# Patient Record
Sex: MALE | Race: WHITE | NOT HISPANIC OR LATINO | URBAN - METROPOLITAN AREA
[De-identification: names, ages, dates, MRNs, and addresses within clinical notes are randomized per-mention and may not be internally consistent; named-entity substitution may affect disease eponyms.]

---

## 2019-07-08 ENCOUNTER — HISTORICAL (OUTPATIENT)
Dept: CARDIOLOGY | Facility: HOSPITAL | Age: 49
End: 2019-07-08

## 2022-04-11 ENCOUNTER — HISTORICAL (OUTPATIENT)
Dept: ADMINISTRATIVE | Facility: HOSPITAL | Age: 52
End: 2022-04-11

## 2022-04-27 VITALS
HEIGHT: 70 IN | DIASTOLIC BLOOD PRESSURE: 100 MMHG | SYSTOLIC BLOOD PRESSURE: 176 MMHG | BODY MASS INDEX: 32.51 KG/M2 | WEIGHT: 227.06 LBS

## 2024-04-10 ENCOUNTER — HOSPITAL ENCOUNTER (INPATIENT)
Facility: HOSPITAL | Age: 54
LOS: 3 days | Discharge: HOME | DRG: 872 | End: 2024-04-13
Attending: EMERGENCY MEDICINE | Admitting: INTERNAL MEDICINE
Payer: MEDICAID

## 2024-04-10 DIAGNOSIS — K82.8 GALLBLADDER SLUDGE: ICD-10-CM

## 2024-04-10 DIAGNOSIS — M54.9 ACUTE BILATERAL BACK PAIN, UNSPECIFIED BACK LOCATION: ICD-10-CM

## 2024-04-10 DIAGNOSIS — K81.0 ACUTE CHOLECYSTITIS: Primary | ICD-10-CM

## 2024-04-10 DIAGNOSIS — A41.9 ACUTE ONSET SEPSIS: ICD-10-CM

## 2024-04-10 DIAGNOSIS — R55 SYNCOPE: ICD-10-CM

## 2024-04-10 DIAGNOSIS — R11.2 NAUSEA AND VOMITING, UNSPECIFIED VOMITING TYPE: ICD-10-CM

## 2024-04-10 DIAGNOSIS — R07.9 CHEST PAIN: ICD-10-CM

## 2024-04-10 LAB
ALBUMIN SERPL-MCNC: 3.6 G/DL (ref 3.5–5)
ALBUMIN/GLOB SERPL: 1.1 RATIO (ref 1.1–2)
ALP SERPL-CCNC: 116 UNIT/L (ref 40–150)
ALT SERPL-CCNC: 109 UNIT/L (ref 0–55)
APPEARANCE UR: CLEAR
AST SERPL-CCNC: 105 UNIT/L (ref 5–34)
BACTERIA #/AREA URNS AUTO: ABNORMAL /HPF
BASOPHILS # BLD AUTO: 0.06 X10(3)/MCL
BASOPHILS NFR BLD AUTO: 0.3 %
BILIRUB SERPL-MCNC: 1.9 MG/DL
BILIRUB UR QL STRIP.AUTO: ABNORMAL
BUN SERPL-MCNC: 11.7 MG/DL (ref 8.4–25.7)
CALCIUM SERPL-MCNC: 9 MG/DL (ref 8.4–10.2)
CHLORIDE SERPL-SCNC: 105 MMOL/L (ref 98–107)
CO2 SERPL-SCNC: 22 MMOL/L (ref 22–29)
COLOR UR AUTO: ABNORMAL
CREAT SERPL-MCNC: 0.82 MG/DL (ref 0.73–1.18)
EOSINOPHIL # BLD AUTO: 0.1 X10(3)/MCL (ref 0–0.9)
EOSINOPHIL NFR BLD AUTO: 0.5 %
ERYTHROCYTE [DISTWIDTH] IN BLOOD BY AUTOMATED COUNT: 13.4 % (ref 11.5–17)
GFR SERPLBLD CREATININE-BSD FMLA CKD-EPI: >60 MLS/MIN/1.73/M2
GLOBULIN SER-MCNC: 3.2 GM/DL (ref 2.4–3.5)
GLUCOSE SERPL-MCNC: 176 MG/DL (ref 74–100)
GLUCOSE UR QL STRIP.AUTO: ABNORMAL
HCT VFR BLD AUTO: 47.7 % (ref 42–52)
HGB BLD-MCNC: 16.2 G/DL (ref 14–18)
IMM GRANULOCYTES # BLD AUTO: 0.14 X10(3)/MCL (ref 0–0.04)
IMM GRANULOCYTES NFR BLD AUTO: 0.7 %
KETONES UR QL STRIP.AUTO: ABNORMAL
LEUKOCYTE ESTERASE UR QL STRIP.AUTO: NEGATIVE
LYMPHOCYTES # BLD AUTO: 0.71 X10(3)/MCL (ref 0.6–4.6)
LYMPHOCYTES NFR BLD AUTO: 3.5 %
MCH RBC QN AUTO: 28.3 PG (ref 27–31)
MCHC RBC AUTO-ENTMCNC: 34 G/DL (ref 33–36)
MCV RBC AUTO: 83.4 FL (ref 80–94)
MONOCYTES # BLD AUTO: 1.12 X10(3)/MCL (ref 0.1–1.3)
MONOCYTES NFR BLD AUTO: 5.6 %
MUCOUS THREADS URNS QL MICRO: ABNORMAL /LPF
NEUTROPHILS # BLD AUTO: 18.03 X10(3)/MCL (ref 2.1–9.2)
NEUTROPHILS NFR BLD AUTO: 89.4 %
NITRITE UR QL STRIP.AUTO: NEGATIVE
NRBC BLD AUTO-RTO: 0 %
OHS QRS DURATION: 124 MS
OHS QTC CALCULATION: 435 MS
PH UR STRIP.AUTO: 5.5 [PH]
PLATELET # BLD AUTO: 183 X10(3)/MCL (ref 130–400)
PMV BLD AUTO: 10.1 FL (ref 7.4–10.4)
POTASSIUM SERPL-SCNC: 3.4 MMOL/L (ref 3.5–5.1)
PROT SERPL-MCNC: 6.8 GM/DL (ref 6.4–8.3)
PROT UR QL STRIP.AUTO: ABNORMAL
RBC # BLD AUTO: 5.72 X10(6)/MCL (ref 4.7–6.1)
RBC #/AREA URNS AUTO: ABNORMAL /HPF
RBC UR QL AUTO: ABNORMAL
SODIUM SERPL-SCNC: 135 MMOL/L (ref 136–145)
SP GR UR STRIP.AUTO: 1.04 (ref 1–1.03)
SQUAMOUS #/AREA URNS LPF: ABNORMAL /HPF
TROPONIN I SERPL-MCNC: 0.02 NG/ML (ref 0–0.04)
UROBILINOGEN UR STRIP-ACNC: 4
WBC # SPEC AUTO: 20.16 X10(3)/MCL (ref 4.5–11.5)
WBC #/AREA URNS AUTO: ABNORMAL /HPF

## 2024-04-10 PROCEDURE — 93010 ELECTROCARDIOGRAM REPORT: CPT | Mod: ,,, | Performed by: INTERNAL MEDICINE

## 2024-04-10 PROCEDURE — 11000001 HC ACUTE MED/SURG PRIVATE ROOM

## 2024-04-10 PROCEDURE — 80053 COMPREHEN METABOLIC PANEL: CPT

## 2024-04-10 PROCEDURE — 84484 ASSAY OF TROPONIN QUANT: CPT

## 2024-04-10 PROCEDURE — 81001 URINALYSIS AUTO W/SCOPE: CPT

## 2024-04-10 PROCEDURE — 85025 COMPLETE CBC W/AUTO DIFF WBC: CPT

## 2024-04-10 PROCEDURE — 93005 ELECTROCARDIOGRAM TRACING: CPT

## 2024-04-10 NOTE — FIRST PROVIDER EVALUATION
"Medical screening examination initiated.  I have conducted a focused provider triage encounter, findings are as follows:    Brief history of present illness:  arrived to ED due to multiple syncopal episodes, dizziness, and headache. Patient denies falling. Reports LOC occurred while sitting. On BT.     Vitals:    04/10/24 1815   BP: (!) 141/72   Pulse: 84   Resp: 20   Temp: 100.3 °F (37.9 °C)   TempSrc: Oral   SpO2: 95%   Weight: 108.9 kg (240 lb)   Height: 5' 10" (1.778 m)       Pertinent physical exam:  awake, alert, in wheelchair, has non-labored breathing    Brief workup plan:  labs, EKG, imaging     Preliminary workup initiated; this workup will be continued and followed by the physician or advanced practice provider that is assigned to the patient when roomed.  "

## 2024-04-11 LAB
ALBUMIN SERPL-MCNC: 3 G/DL (ref 3.5–5)
ALBUMIN/GLOB SERPL: 1.3 RATIO (ref 1.1–2)
ALP SERPL-CCNC: 89 UNIT/L (ref 40–150)
ALT SERPL-CCNC: 81 UNIT/L (ref 0–55)
AMPHET UR QL SCN: NEGATIVE
AMYLASE SERPL-CCNC: 12 UNIT/L (ref 25–125)
AST SERPL-CCNC: 52 UNIT/L (ref 5–34)
AV INDEX (PROSTH): 0.58
AV MEAN GRADIENT: 10 MMHG
AV PEAK GRADIENT: 17 MMHG
AV VALVE AREA BY VELOCITY RATIO: 3.18 CM²
AV VALVE AREA: 3.34 CM²
AV VELOCITY RATIO: 0.56
BARBITURATE SCN PRESENT UR: NEGATIVE
BASOPHILS # BLD AUTO: 0.04 X10(3)/MCL
BASOPHILS NFR BLD AUTO: 0.2 %
BENZODIAZ UR QL SCN: NEGATIVE
BILIRUB SERPL-MCNC: 2.3 MG/DL
BILIRUB SERPL-MCNC: 2.3 MG/DL
BILIRUBIN DIRECT+TOT PNL SERPL-MCNC: 0.8 MG/DL (ref 0–?)
BILIRUBIN DIRECT+TOT PNL SERPL-MCNC: 1.5 MG/DL (ref 0–0.8)
BNP BLD-MCNC: 180.6 PG/ML
BSA FOR ECHO PROCEDURE: 2.32 M2
BUN SERPL-MCNC: 9.1 MG/DL (ref 8.4–25.7)
CALCIUM SERPL-MCNC: 8.3 MG/DL (ref 8.4–10.2)
CANNABINOIDS UR QL SCN: NEGATIVE
CHLORIDE SERPL-SCNC: 101 MMOL/L (ref 98–107)
CO2 SERPL-SCNC: 27 MMOL/L (ref 22–29)
COCAINE UR QL SCN: NEGATIVE
CREAT SERPL-MCNC: 0.82 MG/DL (ref 0.73–1.18)
CV ECHO LV RWT: 0.4 CM
DOP CALC AO PEAK VEL: 2.07 M/S
DOP CALC AO VTI: 36.8 CM
DOP CALC LVOT AREA: 5.7 CM2
DOP CALC LVOT DIAMETER: 2.7 CM
DOP CALC LVOT PEAK VEL: 1.15 M/S
DOP CALC LVOT STROKE VOLUME: 123.04 CM3
DOP CALC MV VTI: 34.3 CM
DOP CALCLVOT PEAK VEL VTI: 21.5 CM
E WAVE DECELERATION TIME: 215 MSEC
E/A RATIO: 0.99
E/E' RATIO: 10.75 M/S
ECHO LV POSTERIOR WALL: 1.3 CM (ref 0.6–1.1)
EOSINOPHIL # BLD AUTO: 0.03 X10(3)/MCL (ref 0–0.9)
EOSINOPHIL NFR BLD AUTO: 0.2 %
ERYTHROCYTE [DISTWIDTH] IN BLOOD BY AUTOMATED COUNT: 13.7 % (ref 11.5–17)
EST. AVERAGE GLUCOSE BLD GHB EST-MCNC: 197.3 MG/DL
FENTANYL UR QL SCN: NEGATIVE
FRACTIONAL SHORTENING: 23 % (ref 28–44)
GFR SERPLBLD CREATININE-BSD FMLA CKD-EPI: >60 MLS/MIN/1.73/M2
GLOBULIN SER-MCNC: 2.4 GM/DL (ref 2.4–3.5)
GLUCOSE SERPL-MCNC: 122 MG/DL (ref 74–100)
HBA1C MFR BLD: 8.5 %
HCT VFR BLD AUTO: 40.6 % (ref 42–52)
HGB BLD-MCNC: 14.3 G/DL (ref 14–18)
IMM GRANULOCYTES # BLD AUTO: 0.13 X10(3)/MCL (ref 0–0.04)
IMM GRANULOCYTES NFR BLD AUTO: 0.7 %
INTERVENTRICULAR SEPTUM: 1.75 CM (ref 0.6–1.1)
LACTATE SERPL-SCNC: 1.4 MMOL/L (ref 0.5–2.2)
LACTATE SERPL-SCNC: 2.1 MMOL/L (ref 0.5–2.2)
LEFT ATRIUM SIZE: 4.6 CM
LEFT ATRIUM VOLUME INDEX MOD: 23.8 ML/M2
LEFT ATRIUM VOLUME MOD: 53.9 CM3
LEFT INTERNAL DIMENSION IN SYSTOLE: 5.02 CM (ref 2.1–4)
LEFT VENTRICLE DIASTOLIC VOLUME INDEX: 96.46 ML/M2
LEFT VENTRICLE DIASTOLIC VOLUME: 218 ML
LEFT VENTRICLE MASS INDEX: 221 G/M2
LEFT VENTRICLE SYSTOLIC VOLUME INDEX: 52.7 ML/M2
LEFT VENTRICLE SYSTOLIC VOLUME: 119 ML
LEFT VENTRICULAR INTERNAL DIMENSION IN DIASTOLE: 6.52 CM (ref 3.5–6)
LEFT VENTRICULAR MASS: 499.22 G
LIPASE SERPL-CCNC: 10 U/L
LV LATERAL E/E' RATIO: 8.6 M/S
LV SEPTAL E/E' RATIO: 14.33 M/S
LVOT MG: 3 MMHG
LVOT MV: 0.73 CM/S
LYMPHOCYTES # BLD AUTO: 1.23 X10(3)/MCL (ref 0.6–4.6)
LYMPHOCYTES NFR BLD AUTO: 6.5 %
MAGNESIUM SERPL-MCNC: 1.4 MG/DL (ref 1.6–2.6)
MCH RBC QN AUTO: 29 PG (ref 27–31)
MCHC RBC AUTO-ENTMCNC: 35.2 G/DL (ref 33–36)
MCV RBC AUTO: 82.4 FL (ref 80–94)
MDMA UR QL SCN: NEGATIVE
MONOCYTES # BLD AUTO: 1.11 X10(3)/MCL (ref 0.1–1.3)
MONOCYTES NFR BLD AUTO: 5.8 %
MV MEAN GRADIENT: 3 MMHG
MV PEAK A VEL: 0.87 M/S
MV PEAK E VEL: 0.86 M/S
MV PEAK GRADIENT: 5 MMHG
MV STENOSIS PRESSURE HALF TIME: 92 MS
MV VALVE AREA BY CONTINUITY EQUATION: 3.59 CM2
MV VALVE AREA P 1/2 METHOD: 2.39 CM2
NEUTROPHILS # BLD AUTO: 16.44 X10(3)/MCL (ref 2.1–9.2)
NEUTROPHILS NFR BLD AUTO: 86.6 %
NRBC BLD AUTO-RTO: 0 %
OHS LV EJECTION FRACTION SIMPSONS BIPLANE MOD: 61 %
OPIATES UR QL SCN: POSITIVE
PCP UR QL: NEGATIVE
PH UR: 6 [PH] (ref 3–11)
PHOSPHATE SERPL-MCNC: 3.2 MG/DL (ref 2.3–4.7)
PISA TR MAX VEL: 1.34 M/S
PLATELET # BLD AUTO: 148 X10(3)/MCL (ref 130–400)
PMV BLD AUTO: 10 FL (ref 7.4–10.4)
POCT GLUCOSE: 277 MG/DL (ref 70–110)
POTASSIUM SERPL-SCNC: 3.4 MMOL/L (ref 3.5–5.1)
PROT SERPL-MCNC: 5.4 GM/DL (ref 6.4–8.3)
PV PEAK GRADIENT: 6 MMHG
PV PEAK VELOCITY: 1.22 M/S
RA PRESSURE ESTIMATED: 3 MMHG
RBC # BLD AUTO: 4.93 X10(6)/MCL (ref 4.7–6.1)
RIGHT VENTRICULAR END-DIASTOLIC DIMENSION: 2.99 CM
RV TB RVSP: 4 MMHG
SODIUM SERPL-SCNC: 135 MMOL/L (ref 136–145)
SPECIFIC GRAVITY, URINE AUTO (.000) (OHS): 1.02 (ref 1–1.03)
TDI LATERAL: 0.1 M/S
TDI SEPTAL: 0.06 M/S
TDI: 0.08 M/S
TR MAX PG: 7 MMHG
TROPONIN I SERPL-MCNC: 0.01 NG/ML (ref 0–0.04)
TV REST PULMONARY ARTERY PRESSURE: 10 MMHG
WBC # SPEC AUTO: 18.98 X10(3)/MCL (ref 4.5–11.5)
Z-SCORE OF LEFT VENTRICULAR DIMENSION IN END DIASTOLE: -2.4
Z-SCORE OF LEFT VENTRICULAR DIMENSION IN END SYSTOLE: 0

## 2024-04-11 PROCEDURE — 96366 THER/PROPH/DIAG IV INF ADDON: CPT | Mod: 59

## 2024-04-11 PROCEDURE — 25000003 PHARM REV CODE 250: Performed by: EMERGENCY MEDICINE

## 2024-04-11 PROCEDURE — 83036 HEMOGLOBIN GLYCOSYLATED A1C: CPT | Performed by: INTERNAL MEDICINE

## 2024-04-11 PROCEDURE — 63600175 PHARM REV CODE 636 W HCPCS: Performed by: PHYSICIAN ASSISTANT

## 2024-04-11 PROCEDURE — 80053 COMPREHEN METABOLIC PANEL: CPT | Performed by: NURSE PRACTITIONER

## 2024-04-11 PROCEDURE — 96375 TX/PRO/DX INJ NEW DRUG ADDON: CPT

## 2024-04-11 PROCEDURE — 63600175 PHARM REV CODE 636 W HCPCS: Performed by: NURSE PRACTITIONER

## 2024-04-11 PROCEDURE — 84100 ASSAY OF PHOSPHORUS: CPT | Performed by: NURSE PRACTITIONER

## 2024-04-11 PROCEDURE — 25500020 PHARM REV CODE 255: Performed by: EMERGENCY MEDICINE

## 2024-04-11 PROCEDURE — 25000003 PHARM REV CODE 250: Performed by: NURSE PRACTITIONER

## 2024-04-11 PROCEDURE — 80307 DRUG TEST PRSMV CHEM ANLYZR: CPT | Performed by: NURSE PRACTITIONER

## 2024-04-11 PROCEDURE — 99285 EMERGENCY DEPT VISIT HI MDM: CPT | Mod: 25

## 2024-04-11 PROCEDURE — 83690 ASSAY OF LIPASE: CPT | Performed by: INTERNAL MEDICINE

## 2024-04-11 PROCEDURE — 63600175 PHARM REV CODE 636 W HCPCS: Performed by: EMERGENCY MEDICINE

## 2024-04-11 PROCEDURE — 25000003 PHARM REV CODE 250: Performed by: INTERNAL MEDICINE

## 2024-04-11 PROCEDURE — 63600175 PHARM REV CODE 636 W HCPCS: Performed by: INTERNAL MEDICINE

## 2024-04-11 PROCEDURE — 84484 ASSAY OF TROPONIN QUANT: CPT | Performed by: NURSE PRACTITIONER

## 2024-04-11 PROCEDURE — 87040 BLOOD CULTURE FOR BACTERIA: CPT | Performed by: EMERGENCY MEDICINE

## 2024-04-11 PROCEDURE — 83880 ASSAY OF NATRIURETIC PEPTIDE: CPT | Performed by: NURSE PRACTITIONER

## 2024-04-11 PROCEDURE — 80074 ACUTE HEPATITIS PANEL: CPT | Performed by: PHYSICIAN ASSISTANT

## 2024-04-11 PROCEDURE — 85025 COMPLETE CBC W/AUTO DIFF WBC: CPT | Performed by: NURSE PRACTITIONER

## 2024-04-11 PROCEDURE — 83735 ASSAY OF MAGNESIUM: CPT | Performed by: NURSE PRACTITIONER

## 2024-04-11 PROCEDURE — 21400001 HC TELEMETRY ROOM

## 2024-04-11 PROCEDURE — 25000003 PHARM REV CODE 250: Performed by: PHYSICIAN ASSISTANT

## 2024-04-11 PROCEDURE — 82150 ASSAY OF AMYLASE: CPT | Performed by: INTERNAL MEDICINE

## 2024-04-11 PROCEDURE — 83605 ASSAY OF LACTIC ACID: CPT | Performed by: EMERGENCY MEDICINE

## 2024-04-11 PROCEDURE — 82247 BILIRUBIN TOTAL: CPT | Performed by: PHYSICIAN ASSISTANT

## 2024-04-11 PROCEDURE — 96365 THER/PROPH/DIAG IV INF INIT: CPT

## 2024-04-11 RX ORDER — CARVEDILOL 12.5 MG/1
25 TABLET ORAL 2 TIMES DAILY WITH MEALS
Status: DISCONTINUED | OUTPATIENT
Start: 2024-04-11 | End: 2024-04-13 | Stop reason: HOSPADM

## 2024-04-11 RX ORDER — ASPIRIN 81 MG/1
81 TABLET ORAL DAILY
Status: DISCONTINUED | OUTPATIENT
Start: 2024-04-12 | End: 2024-04-13 | Stop reason: HOSPADM

## 2024-04-11 RX ORDER — POLYETHYLENE GLYCOL 3350 17 G/17G
17 POWDER, FOR SOLUTION ORAL 2 TIMES DAILY PRN
Status: DISCONTINUED | OUTPATIENT
Start: 2024-04-11 | End: 2024-04-13 | Stop reason: HOSPADM

## 2024-04-11 RX ORDER — ENOXAPARIN SODIUM 100 MG/ML
40 INJECTION SUBCUTANEOUS EVERY 24 HOURS
Status: DISCONTINUED | OUTPATIENT
Start: 2024-04-11 | End: 2024-04-13 | Stop reason: HOSPADM

## 2024-04-11 RX ORDER — MORPHINE SULFATE 4 MG/ML
4 INJECTION, SOLUTION INTRAMUSCULAR; INTRAVENOUS
Status: COMPLETED | OUTPATIENT
Start: 2024-04-11 | End: 2024-04-11

## 2024-04-11 RX ORDER — ROSUVASTATIN CALCIUM 40 MG/1
40 TABLET, COATED ORAL NIGHTLY
COMMUNITY

## 2024-04-11 RX ORDER — SODIUM CHLORIDE 9 MG/ML
INJECTION, SOLUTION INTRAVENOUS CONTINUOUS
Status: DISCONTINUED | OUTPATIENT
Start: 2024-04-11 | End: 2024-04-12

## 2024-04-11 RX ORDER — TALC
6 POWDER (GRAM) TOPICAL NIGHTLY PRN
Status: DISCONTINUED | OUTPATIENT
Start: 2024-04-11 | End: 2024-04-13 | Stop reason: HOSPADM

## 2024-04-11 RX ORDER — MAGNESIUM SULFATE HEPTAHYDRATE 40 MG/ML
2 INJECTION, SOLUTION INTRAVENOUS ONCE
Status: COMPLETED | OUTPATIENT
Start: 2024-04-11 | End: 2024-04-11

## 2024-04-11 RX ORDER — ASPIRIN 81 MG/1
81 TABLET ORAL DAILY
COMMUNITY

## 2024-04-11 RX ORDER — CARVEDILOL 25 MG/1
25 TABLET ORAL 2 TIMES DAILY WITH MEALS
COMMUNITY

## 2024-04-11 RX ORDER — PROCHLORPERAZINE EDISYLATE 5 MG/ML
5 INJECTION INTRAMUSCULAR; INTRAVENOUS EVERY 6 HOURS PRN
Status: DISCONTINUED | OUTPATIENT
Start: 2024-04-11 | End: 2024-04-13 | Stop reason: HOSPADM

## 2024-04-11 RX ORDER — ALUMINUM HYDROXIDE, MAGNESIUM HYDROXIDE, AND SIMETHICONE 1200; 120; 1200 MG/30ML; MG/30ML; MG/30ML
30 SUSPENSION ORAL 4 TIMES DAILY PRN
Status: DISCONTINUED | OUTPATIENT
Start: 2024-04-11 | End: 2024-04-13 | Stop reason: HOSPADM

## 2024-04-11 RX ORDER — FUROSEMIDE 40 MG/1
40 TABLET ORAL DAILY
COMMUNITY

## 2024-04-11 RX ORDER — AMLODIPINE BESYLATE 10 MG/1
10 TABLET ORAL DAILY
Status: ON HOLD | COMMUNITY
End: 2024-04-13 | Stop reason: HOSPADM

## 2024-04-11 RX ORDER — FUROSEMIDE 40 MG/1
40 TABLET ORAL DAILY
Status: DISCONTINUED | OUTPATIENT
Start: 2024-04-12 | End: 2024-04-12

## 2024-04-11 RX ORDER — ONDANSETRON HYDROCHLORIDE 2 MG/ML
4 INJECTION, SOLUTION INTRAVENOUS
Status: COMPLETED | OUTPATIENT
Start: 2024-04-11 | End: 2024-04-11

## 2024-04-11 RX ORDER — ACETAMINOPHEN 325 MG/1
650 TABLET ORAL EVERY 6 HOURS PRN
Status: DISCONTINUED | OUTPATIENT
Start: 2024-04-11 | End: 2024-04-13 | Stop reason: HOSPADM

## 2024-04-11 RX ORDER — AMLODIPINE BESYLATE 5 MG/1
5 TABLET ORAL DAILY
Status: DISCONTINUED | OUTPATIENT
Start: 2024-04-12 | End: 2024-04-13 | Stop reason: HOSPADM

## 2024-04-11 RX ORDER — ATORVASTATIN CALCIUM 40 MG/1
40 TABLET, FILM COATED ORAL DAILY
Status: DISCONTINUED | OUTPATIENT
Start: 2024-04-12 | End: 2024-04-13 | Stop reason: HOSPADM

## 2024-04-11 RX ORDER — INSULIN ASPART 100 [IU]/ML
0-10 INJECTION, SOLUTION INTRAVENOUS; SUBCUTANEOUS EVERY 6 HOURS PRN
Status: DISCONTINUED | OUTPATIENT
Start: 2024-04-11 | End: 2024-04-13 | Stop reason: HOSPADM

## 2024-04-11 RX ORDER — ACETAMINOPHEN 325 MG/1
650 TABLET ORAL
Status: COMPLETED | OUTPATIENT
Start: 2024-04-11 | End: 2024-04-11

## 2024-04-11 RX ORDER — NALOXONE HCL 0.4 MG/ML
0.02 VIAL (ML) INJECTION
Status: DISCONTINUED | OUTPATIENT
Start: 2024-04-11 | End: 2024-04-13 | Stop reason: HOSPADM

## 2024-04-11 RX ORDER — SIMETHICONE 80 MG
1 TABLET,CHEWABLE ORAL 4 TIMES DAILY PRN
Status: DISCONTINUED | OUTPATIENT
Start: 2024-04-11 | End: 2024-04-13 | Stop reason: HOSPADM

## 2024-04-11 RX ORDER — GLUCAGON 1 MG
1 KIT INJECTION
Status: DISCONTINUED | OUTPATIENT
Start: 2024-04-11 | End: 2024-04-13 | Stop reason: HOSPADM

## 2024-04-11 RX ORDER — SACUBITRIL AND VALSARTAN 97; 103 MG/1; MG/1
1 TABLET, FILM COATED ORAL 2 TIMES DAILY
COMMUNITY

## 2024-04-11 RX ORDER — ONDANSETRON HYDROCHLORIDE 2 MG/ML
4 INJECTION, SOLUTION INTRAVENOUS EVERY 4 HOURS PRN
Status: DISCONTINUED | OUTPATIENT
Start: 2024-04-11 | End: 2024-04-13 | Stop reason: HOSPADM

## 2024-04-11 RX ADMIN — ACETAMINOPHEN 650 MG: 325 TABLET, FILM COATED ORAL at 08:04

## 2024-04-11 RX ADMIN — PIPERACILLIN SODIUM AND TAZOBACTAM SODIUM 4.5 G: 4; .5 INJECTION, POWDER, LYOPHILIZED, FOR SOLUTION INTRAVENOUS at 06:04

## 2024-04-11 RX ADMIN — CARVEDILOL 25 MG: 12.5 TABLET, FILM COATED ORAL at 06:04

## 2024-04-11 RX ADMIN — ONDANSETRON 4 MG: 2 INJECTION INTRAMUSCULAR; INTRAVENOUS at 01:04

## 2024-04-11 RX ADMIN — VANCOMYCIN HYDROCHLORIDE 2500 MG: 1.25 INJECTION, POWDER, LYOPHILIZED, FOR SOLUTION INTRAVENOUS at 02:04

## 2024-04-11 RX ADMIN — SODIUM CHLORIDE, POTASSIUM CHLORIDE, SODIUM LACTATE AND CALCIUM CHLORIDE 3267 ML: 600; 310; 30; 20 INJECTION, SOLUTION INTRAVENOUS at 01:04

## 2024-04-11 RX ADMIN — SODIUM CHLORIDE: 9 INJECTION, SOLUTION INTRAVENOUS at 12:04

## 2024-04-11 RX ADMIN — PIPERACILLIN SODIUM AND TAZOBACTAM SODIUM 4.5 G: 4; .5 INJECTION, POWDER, LYOPHILIZED, FOR SOLUTION INTRAVENOUS at 08:04

## 2024-04-11 RX ADMIN — PIPERACILLIN SODIUM AND TAZOBACTAM SODIUM 4.5 G: 4; .5 INJECTION, POWDER, LYOPHILIZED, FOR SOLUTION INTRAVENOUS at 01:04

## 2024-04-11 RX ADMIN — MAGNESIUM SULFATE HEPTAHYDRATE 2 G: 40 INJECTION, SOLUTION INTRAVENOUS at 02:04

## 2024-04-11 RX ADMIN — MORPHINE SULFATE 4 MG: 4 INJECTION, SOLUTION INTRAMUSCULAR; INTRAVENOUS at 01:04

## 2024-04-11 RX ADMIN — SACUBITRIL AND VALSARTAN 1 TABLET: 97; 103 TABLET, FILM COATED ORAL at 08:04

## 2024-04-11 RX ADMIN — IOHEXOL 100 ML: 350 INJECTION, SOLUTION INTRAVENOUS at 01:04

## 2024-04-11 RX ADMIN — INSULIN ASPART 3 UNITS: 100 INJECTION, SOLUTION INTRAVENOUS; SUBCUTANEOUS at 11:04

## 2024-04-11 RX ADMIN — ACETAMINOPHEN 325MG 650 MG: 325 TABLET ORAL at 06:04

## 2024-04-11 RX ADMIN — ENOXAPARIN SODIUM 40 MG: 40 INJECTION SUBCUTANEOUS at 06:04

## 2024-04-11 RX ADMIN — VANCOMYCIN HYDROCHLORIDE 1750 MG: 750 INJECTION, POWDER, LYOPHILIZED, FOR SOLUTION INTRAVENOUS at 02:04

## 2024-04-11 NOTE — CONSULTS
Acute Care Surgery   History and Physical Note    Patient Name: Manas Felix Jr.  YOB: 1970  Date: 04/11/2024 11:16 AM  Date of Admission: 4/10/2024  HD#0  POD#* No surgery found *    PRESENTING HISTORY   Chief Complaint/Reason for Admission: back and abdominal pain    History of Present Illness:  Patient is a 53 y/o M with PMHx HTN, HLD, CHF, CAD s/p CABG, cardiac arrest x2 with stent placement on Plavix (last dose yesterday), and DM2 presenting with weakness, nausea, back and abdominal pain for one day. CT abdomen and pelvis revealed mild gallbladder distention, moderate dependent hyperdensity seen in the gallbladder which may represent sludge 8 over tiny stone, mild wall thickening and minimal pericholecystic fluid which may represent the beginning of cholecystitis. Labs significant for WBC 20, T. Bili 2.3, AST 52(105), ALT 81 (109). Patient with Tmax of 101, hemodynamically stable.    Review of Systems:  12 point ROS negative except as stated in HPI    PAST HISTORY:   Past medical history:  Past Medical History:   Diagnosis Date    Cardiac arrest     Essential (primary) hypertension     History of open heart surgery        Past surgical history:  History reviewed. No pertinent surgical history.    Family history:  History reviewed. No pertinent family history.    Social history:  Social History     Socioeconomic History    Marital status: Legally    Tobacco Use    Smoking status: Every Day     Current packs/day: 1.00     Types: Cigarettes   Social History Narrative    ** Merged History Encounter **          Social History     Tobacco Use   Smoking Status Every Day    Current packs/day: 1.00    Types: Cigarettes   Smokeless Tobacco Not on file      Social History     Substance and Sexual Activity   Alcohol Use None        MEDICATIONS & ALLERGIES:     No current facility-administered medications on file prior to encounter.     Current Outpatient Medications on File Prior to  "Encounter   Medication Sig    amLODIPine (NORVASC) 10 MG tablet Take 10 mg by mouth once daily.    aspirin (ECOTRIN) 81 MG EC tablet Take 81 mg by mouth once daily.    carvediloL (COREG) 25 MG tablet Take 25 mg by mouth 2 (two) times daily with meals.    furosemide (LASIX) 40 MG tablet Take 40 mg by mouth once daily.    rosuvastatin (CRESTOR) 40 MG Tab Take 40 mg by mouth every evening.    sacubitriL-valsartan (ENTRESTO)  mg per tablet Take 1 tablet by mouth 2 (two) times daily.       Allergies:   Review of patient's allergies indicates:   Allergen Reactions    Demerol [meperidine] Swelling       Scheduled Meds:   enoxparin  40 mg Subcutaneous Daily    piperacillin-tazobactam (Zosyn) IV (PEDS and ADULTS) (extended infusion is not appropriate)  4.5 g Intravenous Q8H    vancomycin (VANCOCIN) IV (PEDS and ADULTS)  1,750 mg Intravenous Q12H       Continuous Infusions:   sodium chloride 0.9%         PRN Meds:acetaminophen, aluminum-magnesium hydroxide-simethicone, dextrose 10%, dextrose 10%, glucagon (human recombinant), insulin aspart U-100, melatonin, naloxone, ondansetron, polyethylene glycol, prochlorperazine, simethicone, vancomycin - pharmacy to dose    OBJECTIVE:   Vital Signs:  VITAL SIGNS: 24 HR MIN & MAX LAST   Temp  Min: 99.4 °F (37.4 °C)  Max: 101 °F (38.3 °C)  99.4 °F (37.4 °C)   BP  Min: 107/67  Max: 150/74  132/64    Pulse  Min: 61  Max: 90  61    Resp  Min: 14  Max: 27  14    SpO2  Min: 90 %  Max: 97 %  96 %      HT: 5' 10" (177.8 cm)  WT: 108.9 kg (240 lb)  BMI: 34.4     Intake/output:  Intake/Output - Last 3 Shifts         04/09 0700  04/10 0659 04/10 0700  04/11 0659 04/11 0700 04/12 0659    I.V. (mL/kg)  4000 (36.7)     Total Intake(mL/kg)  4000 (36.7)     Urine (mL/kg/hr)  1500     Total Output  1500     Net  +2500                    Intake/Output Summary (Last 24 hours) at 4/11/2024 1116  Last data filed at 4/11/2024 0620  Gross per 24 hour   Intake 4000 ml   Output 1500 ml   Net 2500 ml     " "    Physical Exam:  General: Well developed, well nourished, no acute distress, obese  HEENT: Normocephalic, atraumatic, PERRL  CV: RRR  Resp: NWOB on room air  GI:  Abdomen soft, mild tenderness to palpation diffusely, no guarding or rigidity  :  Deferred  MSK: No muscle atrophy, cyanosis, peripheral edema, moving all extremities spontaneously  Skin/wounds:  No rashes, ulcers, erythema  Neuro:  CNII-XII grossly intact, alert and oriented to person, place, and time    Labs:  Troponin:  Recent Labs     04/10/24  1825 04/11/24  0705   TROPONINI 0.016 0.010     CBC:  Recent Labs     04/10/24  1825 04/11/24  0705   WBC 20.16* 18.98*   RBC 5.72 4.93   HGB 16.2 14.3   HCT 47.7 40.6*    148   MCV 83.4 82.4   MCH 28.3 29.0   MCHC 34.0 35.2     CMP:  Recent Labs     04/10/24  1825 04/11/24  0705   CALCIUM 9.0 8.3*   ALBUMIN 3.6 3.0*   * 135*   K 3.4* 3.4*   CO2 22 27   BUN 11.7 9.1   CREATININE 0.82 0.82   ALKPHOS 116 89   * 81*   * 52*   BILITOT 1.9* 2.3*     Lactic Acid:  No results for input(s): "LACTATE" in the last 72 hours.  ETOH:  No results for input(s): "ETHANOL" in the last 72 hours.   Urine Drug Screen:  No results for input(s): "COCAINE", "OPIATE", "BARBITURATE", "AMPHETAMINE", "FENTANYL", "CANNABINOIDS", "MDMA" in the last 72 hours.    Invalid input(s): "BENZODIAZEPINE", "PHENCYCLIDINE"   ABG:  No results for input(s): "PH", "PCO2", "PO2", "HCO3", "BE", "POCSATURATED" in the last 72 hours.    Diagnostic Results:  US Abdomen Limited   Final Result      1. The gallbladder is mildly distended and demonstrates sludge with possible tiny stones in the region of the neck. Similar findings are also noted on the prior examination. The gallbladder wall thickness seems over estimated on the submitted images, measuring 4.4 mm. Sonographic Flores sign is negative on real time evaluation. Indeterminate for cholecystitis. Follow as clinically indicated.      This report is concordant with the " preliminary [facility] report.         Electronically signed by: Fabricio Downing MD   Date:    04/11/2024   Time:    07:01      CT Abdomen Pelvis With IV Contrast NO Oral Contrast   Final Result      1. There is mild gallbladder distension. Moderate dependent hyperdensity is seen in the gallbladder which may represent sludge and/or tiny small varisized stones. There is mild wall thickening and minimal pericholecystic fluid. These may represent beginning or mild cholecystitis. Correlate with clinical and laboratory findings as regards additional evaluation and follow-up.      This report is concordant with the preliminary [facility] report.         Electronically signed by: Fabricio Downing MD   Date:    04/11/2024   Time:    07:02      X-Ray Chest AP Portable   Final Result      Increase interstitial markings.      Otherwise no active pulmonary disease         Electronically signed by: Milad Montana   Date:    04/11/2024   Time:    08:11      CT Head Without Contrast   Final Result      No acute abnormality seen         Electronically signed by: Terrell Hubbard   Date:    04/10/2024   Time:    19:00          ASSESSMENT & PLAN:    53 y/o M with PMHx HTN, HLD, CHF, CAD s/p CABG, cardiac arrest x2 with stent placement on Plavix (last dose yesterday), and DM2 presenting with weakness, nausea, back and abdominal pain for one day. Imaging concerning for early cholecystitis. Labs show WBC 20 with elevated T. Bili and liver enzymes.     -No acute surgical intervention indicated at this time  - Recommend GI consult for elevated liver enzymes and T. bili in setting of cholelithiasis  -Recommend amylase and lipase to rule out gallstone pancreatitis as cause for back pain with abdominal pain  -Cardiology consulted for preop risk stratification    Sharonda Arce MD   [employer] General Surgery, PGY-2

## 2024-04-11 NOTE — H&P
"[facility] Hospital Medicine History & Physical Examination       Patient Name: Manas Felix Jr.  MRN: [id number]  Patient Class: IP- Inpatient   Admission Date: 4/10/2024   Admitting Physician: Barry Parry MD   Length of Stay: 0  Attending Physician: Raciel Mullen MD   Primary Care Provider:  Patient was unsure the name of his PCP   Face-to-Face encounter date: 04/11/2024  Code Status: Full Code    Chief Complaint: Loss of Consciousness (Pt states unwitnessed +LOC 5-6 times today, N/V, dizziness, & HA onset today. States was sitting down for every episode. +BT. Denies CP, SOB, and diarrhea. Pt is a&ox4, gcs of 15 in triage. Hx of open heart surgery and cardiac arrest x2 3 years ago. ), Nausea, and Emesis        Patient information was obtained from patient, patient's family, past medical records and ER records.     HISTORY OF PRESENT ILLNESS:   Manas Felix Jr. is a 54 y.o. White male with a past medical history of hypertension, hyperlipidemia, congestive heart failure, coronary artery disease status post CABG, cardiac arrest, diabetes mellitus type 2. The patient presented to [facility] on 4/10/2024 with a primary complaint of 4/5 syncopal events yesterday while sitting in the bathroom.  He reports he was feeling weak and nauseous when he passed out.  He was unsure to duration of each syncopal event but denies head injury or loss of consciousness.  She has been having generalized abdominal pain.  He denies complaints of chest pain, shortness of breath and fever.  Patient was states " I think I take Plavix" and has stopped taking his glipizide as it was making him feel sick.  His cardiologist is with [facility].    Upon presentation to the ED, temperature 100.3° F reaching a max of 101° F, heart rate 84, blood pressure 141/72, respiratory rate 20 and SpO2 95% on room air.  Labs with WBC 20.16, potassium 3.4, glucose 176, total bilirubin 1.9, , , .6, troponin " 0.016.  UA negative for infection.  EKG normal sinus rhythm, right bundle-branch block, left posterior fascicular block.  Chest x-ray increased interstitial markings but no active pulmonary disease.  CT abdomen and pelvis revealed mild gallbladder distention, moderate dependent hyperdensity seen in the gallbladder which may represent sludge 8 over tiny stone, mild wall thickening and minimal pericholecystic fluid which may represent the beginning of cholecystitis.  Abdominal ultrasound limited revealed sludge in the gallbladder with possible stones in the region of the neck, gallbladder wall thickening over estimated measuring 4.4 mm, negative Flores sign and indeterminate for cholecystitis.  In ED patient received Tylenol, morphine, Zofran, Zosyn and vancomycin.  General surgery consulted.  Patient was admitted to hospital medicine services for further medical management.    PAST MEDICAL HISTORY:     Past Medical History:   Diagnosis Date    Cardiac arrest     Essential (primary) hypertension     History of open heart surgery        PAST SURGICAL HISTORY:   CABG   Left leg surgery   Left knee surgery   Right ankle surgery    ALLERGIES:   Demerol [meperidine]    FAMILY HISTORY:   Mother: Cardiovascular disease    SOCIAL HISTORY:   Smokes 1 pack cigarettes per day  Drinks alcohol occasionally  Denies illicit drug use    HOME MEDICATIONS:   As documented     REVIEW OF SYSTEMS:   Except as documented, all other systems reviewed and negative     PHYSICAL EXAM:     VITAL SIGNS: 24 HRS MIN & MAX LAST   Temp  Min: 99.4 °F (37.4 °C)  Max: 101 °F (38.3 °C) 99.4 °F (37.4 °C)   BP  Min: 107/67  Max: 150/74 132/64   Pulse  Min: 61  Max: 90  61   Resp  Min: 14  Max: 27 14   SpO2  Min: 90 %  Max: 97 % 96 %       General appearance: Well-developed, well-nourished male in no apparent distress, disheveled appearing.  No family at bedside.  HEENT: Atraumatic head.  Drug mucous membranes of oral cavity.  Lungs: Clear to auscultation  bilaterally.   Heart: Regular rate and rhythm.   Abdomen:  Obese, soft, non-distended, non-tender. Bowel sounds are normal.   Extremities: No cyanosis, clubbing. No deformities.  Skin: No Rash. Warm and dry.  Neuro: Awake, alert and oriented. Motor and sensory exams grossly intact.  Psych/mental status: Appropriate mood and affect. Cooperative. Responds appropriately to questions.       LABS AND IMAGING:     Recent Labs   Lab 04/10/24  1825 04/11/24  0705   WBC 20.16* 18.98*   RBC 5.72 4.93   HGB 16.2 14.3   HCT 47.7 40.6*   MCV 83.4 82.4   MCH 28.3 29.0   MCHC 34.0 35.2   RDW 13.4 13.7    148   MPV 10.1 10.0       Recent Labs   Lab 04/10/24  1825 04/11/24  0705   * 135*   K 3.4* 3.4*   CO2 22 27   BUN 11.7 9.1   CREATININE 0.82 0.82   CALCIUM 9.0 8.3*   MG  --  1.40*   ALBUMIN 3.6 3.0*   ALKPHOS 116 89   * 81*   * 52*   BILITOT 1.9* 2.3*       Microbiology Results (last 7 days)       Procedure Component Value Units Date/Time    Blood culture x two cultures. Draw prior to antibiotics. [[id number]] Collected: 04/11/24 0100    Order Status: Resulted Specimen: Blood Updated: 04/11/24 0107    Blood culture x two cultures. Draw prior to antibiotics. [[id number]] Collected: 04/11/24 0100    Order Status: Resulted Specimen: Blood Updated: 04/11/24 0107             X-Ray Chest AP Portable  Narrative: EXAMINATION:  XR CHEST AP PORTABLE    CLINICAL HISTORY:  Sepsis;    TECHNIQUE:  Single frontal view of the chest was performed.    COMPARISON:  June 22, 2021    FINDINGS:  Examination reveals mediastinal silhouette to be within normal limits cardiac silhouette is not enlarged there is some increase interstitial markings but no evidence of focal consolidative changes atelectases effusions or pneumothoraces  Impression: Increase interstitial markings.    Otherwise no active pulmonary disease    Electronically signed by: Milad Montana  Date:    04/11/2024  Time:    08:11  CT Abdomen Pelvis With IV  Contrast NO Oral Contrast  Narrative: EXAMINATION:  CT ABDOMEN PELVIS WITH IV CONTRAST    CLINICAL HISTORY:  Flank pain, kidney stone suspected;Sepsis;    TECHNIQUE:  Axial imaging of the abdomen and pelvis was performed with axial, sagittal and coronal reconstructions.  IV contrast was administered for this study.  Dynamic and delayed images were obtained.  Automated exposure control was utilized to limit radiation exposure to the patient.    DLP for the study is 642 mgycm.    COMPARISON:  No prior pertinent studies available for comparison.    FINDINGS:  Lungs: There is mild nonspecific dependent change at the lung bases.Pleura: No effusions or thickening are seen.Heart: Cardiomegaly is seen. Scattered coronary artery calcification is seen. Pacer leads are seen in the visualized heart.Abdomen:Abdominal Wall: No abdominal wall pathology is seen.Liver: Mild fatty infiltration of the liver is present.Biliary System: No intrahepatic or extrahepatic biliary duct dilatation is seen.Gallbladder: There is mild gallbladder distension. Moderate dependent hyperdensity is seen in the gallbladder which may represent sludge and/or tiny small varisized stones. There is mild wall thickening and minimal pericholecystic fluid. These may represent beginning or mild cholecystitis.Pancreas: There is pronounced fatty infiltration of the pancreas.Spleen: A small calcified granulomas are seen in an otherwise unremarkable appearing spleen.Adrenals: The adrenal glands appear unremarkable.Kidneys: The right kidney appears unremarkable with no stones, cysts, masses, or hydronephrosis. The left kidney appears unremarkable with no stones or hydronephrosis. A single cyst measuring 5.8 mm is seen on Image 86, Series 6 mid pole of the left kidney.Aorta: There is moderate calcification of the abdominal aorta and its branches.IVC: Unremarkable.Bowel:Esophagus: The visualized esophagus appears unremarkable.Stomach: The stomach appears  unremarkable.Duodenum: Unremarkable appearing duodenum.Small Bowel: The small bowel appears unremarkable.Colon: Nondistended.Appendix: The appendix appears unremarkable and is seen on Image 63, Series 6 through Image 69, Series 6.Peritoneum: No intraperitoneal free air or ascites is seen.Pelvis:Bladder: The bladder is nondistended but appears otherwise unremarkable.Male:Prostate gland: The prostate gland appears unremarkable.Bony structures:Dorsal Spine: There is mild multilevel spondylosis of the visualized dorsal spine.Bony Pelvis: The visualized bony structures of the pelvis appear unremarkable.  Impression: 1. There is mild gallbladder distension. Moderate dependent hyperdensity is seen in the gallbladder which may represent sludge and/or tiny small varisized stones. There is mild wall thickening and minimal pericholecystic fluid. These may represent beginning or mild cholecystitis. Correlate with clinical and laboratory findings as regards additional evaluation and follow-up.    This report [facility].    Electronically signed by: Fabricio Downing MD  Date:    04/11/2024  Time:    07:02  US Abdomen Limited  Narrative: EXAMINATION:  US ABDOMEN LIMITED    CLINICAL HISTORY:  possible cholecystitis on CT;    COMPARISON:  No prior pertinent studies currently available for comparison.    FINDINGS:  Findings: Evaluation is limited due to body habitus.Liver: There is mildly enlarged and measures 18 cm with diffuse fatty infiltration. The liver otherwise appears unremarkable.Biliary ducts: The common bile duct is within normal limits at 3.6 mm in diameter.Gallbladder: The gallbladder is mildly distended and demonstrates sludge with possible tiny stones in the region of the neck. Similar findings are also noted on the prior examination. The gallbladder wall thickness seems over estimated on the submitted images, measuring 4.4 mm. Sonographic Flores sign is negative on real time  evaluation. Indeterminate for cholecystitis. Follow as clinically indicated.Pancreas: The pancreas cannot be definitively evaluated due to obscuration by bowel gas.Right kidney:Dimensions: The right kidney measures 12.9 x 6.3 x 8.2 cm and appears unremarkable.Vascular:Portal vein: Flow parameters are within normal limits with hepatopetal flow.Aorta: The abdominal aorta is obscured due to bowel gas.IVC: The IVC is within normal limits.  Impression: 1. The gallbladder is mildly distended and demonstrates sludge with possible tiny stones in the region of the neck. Similar findings are also noted on the prior examination. The gallbladder wall thickness seems over estimated on the submitted images, measuring 4.4 mm. Sonographic Flores sign is negative on real time evaluation. Indeterminate for cholecystitis. Follow as clinically indicated.    This report is concordant with the preliminary [facility] report.    Electronically signed by: Fabricio Downing MD  Date:    04/11/2024  Time:    07:01        ASSESSMENT & PLAN:   Assessment:  Syncope   Gallbladder sludge, ?  Stone  ?  Acute cholecystitis   Leukocytosis   Hyperbilirubinemia   Transaminitis   Tobacco use  History of  hypertension, hyperlipidemia, congestive heart failure, coronary disease status post CABG cardiac arrest, diabetes mellitus type 2    Plan:  - Telemetry monitoring   - Preliminary report of carotid ultrasound revealed less than 50% stenosis bilateral ICA, bilateral vertebral arteries with patent antegrade flow  - Follow results of echo   - General surgery consulted.  Appreciate recommendations  - Continue with vancomycin and Zosyn   - Tylenol as needed for fever   - Accu-Cheks and sliding scale  - Resume appropriate home medications when deemed necessary   - Labs in AM      VTE Prophylaxis: will be placed on Lovenox for DVT prophylaxis and will be advised to be as mobile as possible and sit in a chair as  tolerated      __________________________________________________________________________  INPATIENT LIST OF MEDICATIONS     Current Facility-Administered Medications:     acetaminophen tablet 650 mg, 650 mg, Oral, Q6H PRN, Nhi Wong AGACNP-BC    aluminum-magnesium hydroxide-simethicone 200-200-20 mg/5 mL suspension 30 mL, 30 mL, Oral, QID PRN, Nhi Wong, AGACNP-BC    enoxaparin injection 40 mg, 40 mg, Subcutaneous, Daily, Nhi Wong AGACNP-BC    melatonin tablet 6 mg, 6 mg, Oral, Nightly PRN, Nhi Wong AGACNP-BC    naloxone 0.4 mg/mL injection 0.02 mg, 0.02 mg, Intravenous, PRN, Nhi Wong AGACNP-BC    ondansetron injection 4 mg, 4 mg, Intravenous, Q4H PRN, Nhi Wong AGACNP-BC    piperacillin-tazobactam (ZOSYN) 4.5 g in dextrose 5 % in water (D5W) 100 mL IVPB (MB+), 4.5 g, Intravenous, Q8H, Aleksandra Flynn MD, Last Rate: 25 mL/hr at 04/11/24 0829, 4.5 g at 04/11/24 0829    polyethylene glycol packet 17 g, 17 g, Oral, BID PRN, Nhi Wong AGACNP-BC    prochlorperazine injection Soln 5 mg, 5 mg, Intravenous, Q6H PRN, Nhi Wong AGACNP-BC    simethicone chewable tablet 80 mg, 1 tablet, Oral, QID PRN, Nhi Wong, AGACNP-BC    vancomycin - pharmacy to dose, , Intravenous, pharmacy to manage frequency, Nhi Wong AGACNP-BC    vancomycin 1.75 g in 5 % dextrose 500 mL IVPB, 1,750 mg, Intravenous, Q12H, Nhi Wong, AGACNP-BC  No current outpatient medications on file.      Scheduled Meds:   enoxparin  40 mg Subcutaneous Daily    piperacillin-tazobactam (Zosyn) IV (PEDS and ADULTS) (extended infusion is not appropriate)  4.5 g Intravenous Q8H    vancomycin (VANCOCIN) IV (PEDS and ADULTS)  1,750 mg Intravenous Q12H     Continuous Infusions:  PRN Meds:.acetaminophen, aluminum-magnesium hydroxide-simethicone, melatonin, naloxone, ondansetron, polyethylene glycol, prochlorperazine, simethicone, vancomycin - pharmacy to dose      Discharge Planning and  Disposition: Anticipated discharge to be determined.    IRoz PA, have reviewed and discussed the case with Dr. Raciel Mullen MD       Please see the following addendum for further assessment and plan from there attending MD.    Roz Ovalle PA-C  04/11/2024

## 2024-04-11 NOTE — PROGRESS NOTES
"Pharmacokinetic Initial Assessment: IV Vancomycin    Assessment/Plan:    Initiate intravenous vancomycin with loading dose of 2500 mg once followed by a maintenance dose of vancomycin 1750mg IV every 12 hours  Desired empiric serum trough concentration is 10 to 20 mcg/mL  Draw vancomycin trough level 60 min prior to fourth dose on 4/12 at approximately 1300  Pharmacy will continue to follow and monitor vancomycin.      Please contact pharmacy at extension [phone] with any questions regarding this assessment.     Thank you for the consult,   Sebastian Benson       Patient brief summary:  Manas Felix Jr. is a 54 y.o. male initiated on antimicrobial therapy with IV Vancomycin for treatment of suspected sepsis    Drug Allergies:   Review of patient's allergies indicates:   Allergen Reactions    Demerol [meperidine] Swelling       Actual Body Weight:   Wt Readings from Last 1 Encounters:   04/10/24 108.9 kg (240 lb)       Renal Function:   Estimated Creatinine Clearance: 130.3 mL/min (based on SCr of 0.82 mg/dL).,     CBC (last 72 hours):  Recent Labs   Lab Result Units 04/10/24  1825   WBC x10(3)/mcL 20.16*   Hgb g/dL 16.2   Hct % 47.7   Platelet x10(3)/mcL 183   Mono % % 5.6   Eos % % 0.5   Basophil % % 0.3       Metabolic Panel (last 72 hours):  Recent Labs   Lab Result Units 04/10/24  1825 04/10/24  2102   Sodium Level mmol/L 135*  --    Potassium Level mmol/L 3.4*  --    Chloride mmol/L 105  --    Carbon Dioxide mmol/L 22  --    Glucose Level mg/dL 176*  --    Glucose, UA   --  Trace*   Blood Urea Nitrogen mg/dL 11.7  --    Creatinine mg/dL 0.82  --    Albumin Level g/dL 3.6  --    Bilirubin Total mg/dL 1.9*  --    Alkaline Phosphatase unit/L 116  --    Aspartate Aminotransferase unit/L 105*  --    Alanine Aminotransferase unit/L 109*  --        Drug levels (last 3 results):  No results for input(s): "VANCOMYCINRA", "VANCORANDOM", "VANCOMYCINPE", "VANCOPEAK", "VANCOMYCINTR", "VANCOTROUGH" in the last 72 " hours.    Microbiologic Results:  Microbiology Results (last 7 days)       Procedure Component Value Units Date/Time    Blood culture x two cultures. Draw prior to antibiotics. [[id number]] Collected: 04/11/24 0100    Order Status: Resulted Specimen: Blood Updated: 04/11/24 0107    Blood culture x two cultures. Draw prior to antibiotics. [[id number]] Collected: 04/11/24 0100    Order Status: Resulted Specimen: Blood Updated: 04/11/24 0107
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
## 2024-04-11 NOTE — ED PROVIDER NOTES
Encounter Date: 4/10/2024       History     Chief Complaint   Patient presents with    Loss of Consciousness     Pt states unwitnessed +LOC 5-6 times today, N/V, dizziness, & HA onset today. States was sitting down for every episode. +BT. Denies CP, SOB, and diarrhea. Pt is a&ox4, gcs of 15 in triage. Hx of open heart surgery and cardiac arrest x2 3 years ago.     Nausea    Emesis     54-year-old male presenting with back pain, nausea/vomiting, headache and multiple syncopal events today.  States that he was in a work-related accident in January and has neck and back pain since then.  He is currently taking Norco for these pains.  Reports he had an increase in the back pain yesterday and he was hurting so badly today he started vomiting and states that he would pass out then wake up vomit some more then pass out again.  He also says that he noticed his urine looked like ice tea today    The history is provided by the patient.     Review of patient's allergies indicates:   Allergen Reactions    Demerol [meperidine] Swelling     Past Medical History:   Diagnosis Date    Cardiac arrest     Essential (primary) hypertension     History of open heart surgery      History reviewed. No pertinent surgical history.  History reviewed. No pertinent family history.  Social History     Tobacco Use    Smoking status: Every Day     Current packs/day: 1.00     Types: Cigarettes     Review of Systems   Gastrointestinal:  Positive for nausea and vomiting.   Genitourinary:  Positive for hematuria.   Musculoskeletal:  Positive for back pain.   Neurological:  Positive for syncope and light-headedness.       Physical Exam     Initial Vitals [04/10/24 1815]   BP Pulse Resp Temp SpO2   (!) 141/72 84 20 100.3 °F (37.9 °C) 95 %      MAP       --         Physical Exam    Nursing note and vitals reviewed.  Constitutional: He appears well-developed and well-nourished. He is not diaphoretic. He does not appear ill. No distress.   HENT:   Head:  Normocephalic and atraumatic.   Right Ear: External ear normal.   Left Ear: External ear normal.   Nose: Nose normal.   Mouth/Throat: Oropharynx is clear and moist.   Eyes: Conjunctivae are normal.   Neck: Neck supple. No tracheal deviation present.   Cardiovascular:  Regular rhythm and normal heart sounds.           Tachycardic   Pulmonary/Chest: Breath sounds normal. No respiratory distress.   Abdominal: Abdomen is soft. He exhibits no distension.   Bilateral flank tenderness   Musculoskeletal:         General: Normal range of motion.      Cervical back: Neck supple.      Comments: Pain of entire back     Neurological: He is alert and oriented to person, place, and time. He has normal strength. GCS score is 15. GCS eye subscore is 4. GCS verbal subscore is 5. GCS motor subscore is 6.   Skin: Skin is warm and dry. Capillary refill takes less than 2 seconds. No pallor.   Psychiatric: He has a normal mood and affect. His mood appears not anxious.         ED Course   Critical Care    Date/Time: 4/11/2024 12:45 AM    Performed by: Aleksandra Flynn MD  Authorized by: Aleksandra Flynn MD  Direct patient critical care time: 15 minutes  Additional history critical care time: 5 minutes  Ordering / reviewing critical care time: 5 minutes  Documentation critical care time: 5 minutes  Consulting other physicians critical care time: 5 minutes  Total critical care time (exclusive of procedural time) : 35 minutes  Critical care time was exclusive of separately billable procedures and treating other patients and teaching time.  Critical care was necessary to treat or prevent imminent or life-threatening deterioration of the following conditions: cardiac failure, dehydration, metabolic crisis, sepsis, renal failure, respiratory failure and circulatory failure.  Critical care was time spent personally by me on the following activities: blood draw for specimens, discussions with consultants, interpretation of cardiac output  measurements, evaluation of patient's response to treatment, examination of patient, obtaining history from patient or surrogate, ordering and performing treatments and interventions, ordering and review of laboratory studies and ordering and review of radiographic studies.        Labs Reviewed   COMPREHENSIVE METABOLIC PANEL - Abnormal; Notable for the following components:       Result Value    Sodium Level 135 (*)     Potassium Level 3.4 (*)     Glucose Level 176 (*)     Bilirubin Total 1.9 (*)     Alanine Aminotransferase 109 (*)     Aspartate Aminotransferase 105 (*)     All other components within normal limits   URINALYSIS, REFLEX TO URINE CULTURE - Abnormal; Notable for the following components:    Color, UA Orange (*)     Specific Gravity, UA 1.037 (*)     Protein, UA 1+ (*)     Glucose, UA Trace (*)     Ketones, UA Trace (*)     Blood, UA Trace (*)     Bilirubin, UA 1+ (*)     Urobilinogen, UA 4.0 (*)     Mucous, UA Many (*)     All other components within normal limits   CBC WITH DIFFERENTIAL - Abnormal; Notable for the following components:    WBC 20.16 (*)     Neut # 18.03 (*)     IG# 0.14 (*)     All other components within normal limits   COMPREHENSIVE METABOLIC PANEL - Abnormal; Notable for the following components:    Sodium Level 135 (*)     Potassium Level 3.4 (*)     Glucose Level 122 (*)     Calcium Level Total 8.3 (*)     Protein Total 5.4 (*)     Albumin Level 3.0 (*)     Bilirubin Total 2.3 (*)     Alanine Aminotransferase 81 (*)     Aspartate Aminotransferase 52 (*)     All other components within normal limits   MAGNESIUM - Abnormal; Notable for the following components:    Magnesium Level 1.40 (*)     All other components within normal limits   CBC WITH DIFFERENTIAL - Abnormal; Notable for the following components:    WBC 18.98 (*)     Hct 40.6 (*)     Neut # 16.44 (*)     IG# 0.13 (*)     All other components within normal limits   TROPONIN I - Normal   LACTIC ACID, PLASMA - Normal    LACTIC ACID, PLASMA - Normal   PHOSPHORUS - Normal   BLOOD CULTURE OLG   BLOOD CULTURE OLG   CBC W/ AUTO DIFFERENTIAL    Narrative:     The following orders were created for panel order CBC auto differential.  Procedure                               Abnormality         Status                     ---------                               -----------         ------                     CBC with Differential[[id number]]       Abnormal            Final result                 Please view results for these tests on the individual orders.   CBC W/ AUTO DIFFERENTIAL    Narrative:     The following orders were created for panel order CBC Auto Differential.  Procedure                               Abnormality         Status                     ---------                               -----------         ------                     CBC with Differential[[id number]]       Abnormal            Final result                 Please view results for these tests on the individual orders.   TROPONIN I   B-TYPE NATRIURETIC PEPTIDE   DRUG SCREEN, URINE (BEAKER)     EKG Readings: (Independently Interpreted)   Rhythm: Normal Sinus Rhythm. Heart Rate: 75. Conduction: Bifasicular.     ECG Results              EKG 12-lead (Final result)        Collection Time Result Time QRS Duration OHS QTC Calculation    04/10/24 18:17:23 04/10/24 21:51:55 124 435                     Final result by Interface, Lab In [facility] (04/10/24 21:52:01)                   Narrative:    Test Reason : R55,    Vent. Rate : 075 BPM     Atrial Rate : 075 BPM     P-R Int : 148 ms          QRS Dur : 124 ms      QT Int : 390 ms       P-R-T Axes : 047 165 124 degrees     QTc Int : 435 ms    Normal sinus rhythm  Right bundle branch block  Left posterior fascicular block   Bifascicular block   Abnormal ECG  Confirmed by Mandeep Sequeira MD ([id number]) on 4/10/2024 9:51:52 PM    Referred By:             Confirmed By:Mandeep Sequeira MD                                  Imaging Results               US Abdomen Limited (Final result)  Result time 04/11/24 07:01:24      Final result by Fabricio Downing MD (04/11/24 07:01:24)                   Impression:      1. The gallbladder is mildly distended and demonstrates sludge with possible tiny stones in the region of the neck. Similar findings are also noted on the prior examination. The gallbladder wall thickness seems over estimated on the submitted images, measuring 4.4 mm. Sonographic Flores sign is negative on real time evaluation. Indeterminate for cholecystitis. Follow as clinically indicated.    [facility].      Electronically signed by: Fabricio Downing MD  Date:    04/11/2024  Time:    07:01               Narrative:    EXAMINATION:  US ABDOMEN LIMITED    CLINICAL HISTORY:  possible cholecystitis on CT;    COMPARISON:  No prior pertinent studies currently available for comparison.    FINDINGS:  Findings: Evaluation is limited due to body habitus.Liver: There is mildly enlarged and measures 18 cm with diffuse fatty infiltration. The liver otherwise appears unremarkable.Biliary ducts: The common bile duct is within normal limits at 3.6 mm in diameter.Gallbladder: The gallbladder is mildly distended and demonstrates sludge with possible tiny stones in the region of the neck. Similar findings are also noted on the prior examination. The gallbladder wall thickness seems over estimated on the submitted images, measuring 4.4 mm. Sonographic Flores sign is negative on real time evaluation. Indeterminate for cholecystitis. Follow as clinically indicated.Pancreas: The pancreas cannot be definitively evaluated due to obscuration by bowel gas.Right kidney:Dimensions: The right kidney measures 12.9 x 6.3 x 8.2 cm and appears unremarkable.Vascular:Portal vein: Flow parameters are within normal limits with hepatopetal flow.Aorta: The abdominal aorta is obscured due to bowel gas.IVC: The IVC is within normal limits.                         Preliminary result by Edward Flores Jr., MD (04/11/24 04:52:58)                   Impression:    1. The gallbladder is mildly distended and demonstrates sludge with possible tiny stones in the region of the neck. Similar findings are also noted on the prior examination. The gallbladder wall thickness seems over estimated on the submitted images, measuring 4.4 mm. Sonographic Flores sign is negative on real time evaluation. Indeterminate for cholecystitis. Follow as clinically indicated.  2. Details and other findings as noted above.               Narrative:    START OF REPORT:  Technique: Limited right upper quadrant abdominal ultrasound was performed.    Comparison: Comparison is with CT abdomen and pelvis study dated2024-04-11 01:53:45.    Clinical History: ER14 possible cholecystitis seen on CT, CT done same day read by [facility].    Findings: Evaluation is limited due to body habitus.  Liver: There is mildly enlarged and measures 18 cm with diffuse fatty infiltration. The liver otherwise appears unremarkable.  Biliary ducts: The common bile duct is within normal limits at 3.6 mm in diameter.  Gallbladder: The gallbladder is mildly distended and demonstrates sludge with possible tiny stones in the region of the neck. Similar findings are also noted on the prior examination. The gallbladder wall thickness seems over estimated on the submitted images, measuring 4.4 mm. Sonographic Flores sign is negative on real time evaluation. Indeterminate for cholecystitis. Follow as clinically indicated.  Pancreas: The pancreas cannot be definitively evaluated due to obscuration by bowel gas.  Right kidney:  Dimensions: The right kidney measures 12.9 x 6.3 x 8.2 cm and appears unremarkable.  Vascular:  Portal vein: Flow parameters are within normal limits with hepatopetal flow.  Aorta: The abdominal aorta is obscured due to bowel gas.  IVC: The IVC is within normal limits.                                          CT Abdomen Pelvis With IV Contrast NO Oral Contrast (Final result)  Result time 04/11/24 07:02:22      Final result by Fabricio Downing MD (04/11/24 07:02:22)                   Impression:      1. There is mild gallbladder distension. Moderate dependent hyperdensity is seen in the gallbladder which may represent sludge and/or tiny small varisized stones. There is mild wall thickening and minimal pericholecystic fluid. These may represent beginning or mild cholecystitis. Correlate with clinical and laboratory findings as regards additional evaluation and follow-up.    This report [facility].      Electronically signed by: Fabricio Downing MD  Date:    04/11/2024  Time:    07:02               Narrative:    EXAMINATION:  CT ABDOMEN PELVIS WITH IV CONTRAST    CLINICAL HISTORY:  Flank pain, kidney stone suspected;Sepsis;    TECHNIQUE:  Axial imaging of the abdomen and pelvis was performed with axial, sagittal and coronal reconstructions.  IV contrast was administered for this study.  Dynamic and delayed images were obtained.  Automated exposure control was utilized to limit radiation exposure to the patient.    DLP for the study is 642 mgycm.    COMPARISON:  No prior pertinent studies available for comparison.    FINDINGS:  Lungs: There is mild nonspecific dependent change at the lung bases.Pleura: No effusions or thickening are seen.Heart: Cardiomegaly is seen. Scattered coronary artery calcification is seen. Pacer leads are seen in the visualized heart.Abdomen:Abdominal Wall: No abdominal wall pathology is seen.Liver: Mild fatty infiltration of the liver is present.Biliary System: No intrahepatic or extrahepatic biliary duct dilatation is seen.Gallbladder: There is mild gallbladder distension. Moderate dependent hyperdensity is seen in the gallbladder which may represent sludge and/or tiny small varisized stones. There is mild wall thickening and minimal  pericholecystic fluid. These may represent beginning or mild cholecystitis.Pancreas: There is pronounced fatty infiltration of the pancreas.Spleen: A small calcified granulomas are seen in an otherwise unremarkable appearing spleen.Adrenals: The adrenal glands appear unremarkable.Kidneys: The right kidney appears unremarkable with no stones, cysts, masses, or hydronephrosis. The left kidney appears unremarkable with no stones or hydronephrosis. A single cyst measuring 5.8 mm is seen on Image 86, Series 6 mid pole of the left kidney.Aorta: There is moderate calcification of the abdominal aorta and its branches.IVC: Unremarkable.Bowel:Esophagus: The visualized esophagus appears unremarkable.Stomach: The stomach appears unremarkable.Duodenum: Unremarkable appearing duodenum.Small Bowel: The small bowel appears unremarkable.Colon: Nondistended.Appendix: The appendix appears unremarkable and is seen on Image 63, Series 6 through Image 69, Series 6.Peritoneum: No intraperitoneal free air or ascites is seen.Pelvis:Bladder: The bladder is nondistended but appears otherwise unremarkable.Male:Prostate gland: The prostate gland appears unremarkable.Bony structures:Dorsal Spine: There is mild multilevel spondylosis of the visualized dorsal spine.Bony Pelvis: The visualized bony structures of the pelvis appear unremarkable.                        Preliminary result by Edward Flores Jr., MD (04/11/24 02:27:51)                   Impression:    1. There is mild gallbladder distension. Moderate dependent hyperdensity is seen in the gallbladder which may represent sludge and/or tiny small varisized stones. There is mild wall thickening and minimal pericholecystic fluid. These may represent beginning or mild cholecystitis. Correlate with clinical and laboratory findings as regards additional evaluation and follow-up.  2. Details and other findings as discussed above.               Narrative:    START OF REPORT:  Technique: CT  of the abdomen and pelvis was performed with axial images as well as sagittal and coronal reconstruction images with intravenous contrast.    Comparison: None available.    Clinical History: Flank pain, kidney stone suspected, Sepsis.    Dosage Information: Automated Exposure Control was utilized.    Findings:  Lines and Tubes: None.  Thorax:  Lungs: There is mild nonspecific dependent change at the lung bases.  Pleura: No effusions or thickening are seen.  Heart: Cardiomegaly is seen. Scattered coronary artery calcification is seen. Pacer leads are seen in the visualized heart.  Abdomen:  Abdominal Wall: No abdominal wall pathology is seen.  Liver: Mild fatty infiltration of the liver is present.  Biliary System: No intrahepatic or extrahepatic biliary duct dilatation is seen.  Gallbladder: There is mild gallbladder distension. Moderate dependent hyperdensity is seen in the gallbladder which may represent sludge and/or tiny small varisized stones. There is mild wall thickening and minimal pericholecystic fluid. These may represent beginning or mild cholecystitis.  Pancreas: There is pronounced fatty infiltration of the pancreas.  Spleen: A small calcified granulomas are seen in an otherwise unremarkable appearing spleen.  Adrenals: The adrenal glands appear unremarkable.  Kidneys: The right kidney appears unremarkable with no stones, cysts, masses, or hydronephrosis. The left kidney appears unremarkable with no stones or hydronephrosis. A single cyst measuring 5.8 mm is seen on Image 86, Series 6 mid pole of the left kidney.  Aorta: There is moderate calcification of the abdominal aorta and its branches.  IVC: Unremarkable.  Bowel:  Esophagus: The visualized esophagus appears unremarkable.  Stomach: The stomach appears unremarkable.  Duodenum: Unremarkable appearing duodenum.  Small Bowel: The small bowel appears unremarkable.  Colon: Nondistended.  Appendix: The appendix appears unremarkable and is seen on Image  63, Series 6 through Image 69, Series 6.  Peritoneum: No intraperitoneal free air or ascites is seen.    Pelvis:  Bladder: The bladder is nondistended but appears otherwise unremarkable.  Male:  Prostate gland: The prostate gland appears unremarkable.    Bony structures:  Dorsal Spine: There is mild multilevel spondylosis of the visualized dorsal spine.  Bony Pelvis: The visualized bony structures of the pelvis appear unremarkable.                                         X-Ray Chest AP Portable (In process)                      CT Head Without Contrast (Final result)  Result time 04/10/24 19:00:50      Final result by Jose Carlos Hubbard MD (04/10/24 19:00:50)                   Impression:      No acute abnormality seen      Electronically signed by: Terrell Hubbard  Date:    04/10/2024  Time:    19:00               Narrative:    EXAMINATION:  CT HEAD WITHOUT CONTRAST    CLINICAL HISTORY:  Syncope, recurrent;    TECHNIQUE:  Multiple axial images were obtained from the base of the brain to the vertex without contrast administration.  Sagittal and coronal reconstructions were performed. .Automatic exposure control  (AEC) is utilized to reduce patient radiation exposure.    COMPARISON:  None    FINDINGS:  There is no intracranial mass or lesion seen.  No hemorrhage is seen.  No infarct is seen.  The ventricles and basilar cisterns appear normal.  Brain parenchyma appears grossly unremarkable.    Posterior fossa appears normal.  The calvarium is intact.  The paranasal sinuses appear grossly unremarkable.                                    X-Rays:   Independently Interpreted Readings:   Chest X-Ray: No acute abnormalities.     Medications   lactated ringers bolus 3,267 mL (0 mLs Intravenous Stopped 4/11/24 0230)   ondansetron injection 4 mg (4 mg Intravenous Given 4/11/24 0128)   morphine injection 4 mg (4 mg Intravenous Given 4/11/24 0129)   vancomycin (VANCOCIN) 2,500 mg in dextrose 5 % (D5W) 500 mL IVPB (0 mg  Intravenous Stopped 4/11/24 0430)   iohexoL (OMNIPAQUE 350) injection 100 mL (100 mLs Intravenous Given 4/11/24 0157)   acetaminophen tablet 650 mg (650 mg Oral Given 4/11/24 8258)     Medical Decision Making  54-year-old male with nausea/vomiting and syncope, increasing chronic back pain    Differential diagnosis includes not limited to anemia, renal failure, dehydration, dysrhythmia, pyelonephritis, renal stone, sepsis, bacteremia, spinal abscess    ED management   Sepsis protocol with 30 minutes/kg ideal body weight fluids and broad-spectrum antibiotics including vancomycin and Zosyn   Tylenol for fever   White count 20 and lactic acid 2.1   Lactic acid improved with IV fluids and blood pressure remained stable   Chest x-ray clear   CT scan of the abdomen with gallbladder sludge, possible stones, pericholecystic fluid   Ultrasound obtained which showed the same   Surgery evaluated patient and patient admitted to the hospitalist      Problems Addressed:  Acute bilateral back pain, unspecified back location: acute illness or injury that poses a threat to life or bodily functions  Acute onset sepsis: acute illness or injury that poses a threat to life or bodily functions  Gallbladder sludge: acute illness or injury that poses a threat to life or bodily functions  Nausea and vomiting, unspecified vomiting type: acute illness or injury that poses a threat to life or bodily functions  Syncope: acute illness or injury that poses a threat to life or bodily functions    Amount and/or Complexity of Data Reviewed  External Data Reviewed: notes.     Details: Reviewed patient's emergency department visit on 01/29/2024 in which he presented for a syncopal event that happened earlier which he attributed to worsening back pain from his recent injury.  He was found to have a urinary tract infection and was prescribed Bactrim and naproxen  Labs: ordered. Decision-making details documented in ED Course.  Radiology: ordered and  "independent interpretation performed. Decision-making details documented in ED Course.  ECG/medicine tests: ordered and independent interpretation performed. Decision-making details documented in ED Course.    Risk  OTC drugs.  Prescription drug management.  Decision regarding hospitalization.               ED Course as of 04/11/24 0755   u Apr 11, 2024   0418 Surgery consulted and will evaluate patient  [KM]   0456 Surgery evaluated patient in ED, would like medicine to admit due to extensive cardiac history [KM]      ED Course User Index  [KM] Aleksandra Flynn MD               Medical Decision Making:   Clinical Tests:   Sepsis Perfusion Assessment: "I attest a sepsis perfusion exam was performed within 6 hours of sepsis, severe sepsis, or septic shock presentation, following fluid resuscitation."             Clinical Impression:  Final diagnoses:  [R55] Syncope  [A41.9] Acute onset sepsis (Primary)  [R11.2] Nausea and vomiting, unspecified vomiting type  [M54.9] Acute bilateral back pain, unspecified back location  [K82.8] Gallbladder sludge          ED Disposition Condition    Admit Stable                Aleksandra Flynn MD  04/11/24 0755       Aleksandra Flynn MD  04/18/24 2053    "

## 2024-04-11 NOTE — CONSULTS
[facility] - Emergency Dept    Cardiology  Consult Note    Patient Name: Manas Felix Jr.  MRN: [id number]  Admission Date: 4/10/2024  Hospital Length of Stay: 0 days  Code Status: Full Code   Attending Provider: Barry Parry MD   Consulting Provider: Josefina Varner MD  Primary Care Physician: Unable, To Obtain  Principal Problem:<principal problem not specified>    Patient information was obtained from patient, past medical records, and ER records.     Subjective:     Chief Complaint/Reason for Consult: surgery clearance    HPI: Manas Felix Jr. Is a 54 year old male with PMH of sudden cardiac arrest 2019, severe multivessel coronary artery disease with previous MI and multiple stent placement and CABGx4 2021,  Ischemic cardiomyopathy ejection fraction 30 to 35% with AICD, hypertension, T2DM, CVA who presented to the ED for syncopal episodes today. Associated with back pain, vomiting, and dark colored urine.   Febrie on arrival, VSOS. Labs showed WBC 20, transaminitis, Tbili 1.9. Lactic wnl. Trop neg. EKG NSR no ST elevation. Imaging showed gallbladder sludge, cholecystitis. Cardiology consulted for surgical clearance.    PMH: sudden cardiac arrest 2019, severe multivessel coronary artery disease with previous MI and multiple stent placement and CABGx4 2021,  Ischemic cardiomyopathy ejection fraction 30 to 35% with AICD, hypertension, T2DM, CVA  PSH: AICD placement  Family History: Father MI, mother CAD  Social History: current tobacco use. Hx of polysubstance abuse. Denies EtOH    Previous Cardiac Diagnostics:     CV US Bilat Doppler Carotid 4/11/24:  The right internal carotid artery demonstrated less than 50% stenosis.  The left internal carotid artery demonstrated less than 50% stenosis.  Bilateral vertebral arteries were patent with antegrade flow.    [facility] 4/30/19  Coronary disease with a patent stent in LAD and distal RCA .   Ischemic cardiomyopathy ejection fraction 30 to 35%      TTE 4/24/19  A STAT echocardiogram was performed using complete 2D.   Images were obtained from the parasternal, apical and subcostal views.   complete arrest     Past Medical History:   Diagnosis Date    Cardiac arrest     Essential (primary) hypertension     History of open heart surgery      History reviewed. No pertinent surgical history.  Review of patient's allergies indicates:   Allergen Reactions    Demerol [meperidine] Swelling     No current facility-administered medications on file prior to encounter.     No current outpatient medications on file prior to encounter.     Family History    None       Tobacco Use    Smoking status: Every Day     Current packs/day: 1.00     Types: Cigarettes    Smokeless tobacco: Not on file   Substance and Sexual Activity    Alcohol use: Not on file    Drug use: Not on file    Sexual activity: Not on file       Review of Systems   Constitutional:  Negative for diaphoresis.   Respiratory:  Negative for shortness of breath.    Cardiovascular:  Negative for chest pain, palpitations and leg swelling.   Gastrointestinal:  Positive for nausea and vomiting. Negative for abdominal pain.   Musculoskeletal:  Positive for back pain and neck pain.     Objective:     Vital Signs (Most Recent):  Temp: 99.4 °F (37.4 °C) (04/11/24 0900)  Pulse: 61 (04/11/24 0857)  Resp: 14 (04/11/24 0727)  BP: 132/64 (04/11/24 0857)  SpO2: 96 % (04/11/24 0857) Vital Signs (24h Range):  Temp:  [99.4 °F (37.4 °C)-101 °F (38.3 °C)] 99.4 °F (37.4 °C)  Pulse:  [61-90] 61  Resp:  [14-27] 14  SpO2:  [90 %-97 %] 96 %  BP: (107-150)/(54-74) 132/64   Weight: 108.9 kg (240 lb)  Body mass index is 34.44 kg/m².  SpO2: 96 %       Intake/Output Summary (Last 24 hours) at 4/11/2024 1012  Last data filed at 4/11/2024 0620  Gross per 24 hour   Intake 4000 ml   Output 1500 ml   Net 2500 ml     Lines/Drains/Airways       Peripheral Intravenous Line  Duration                  Peripheral IV - Single Lumen 04/11/24 0050 18 G  Left Antecubital <1 day         Peripheral IV - Single Lumen 04/11/24 0058 20 G Right Antecubital <1 day                  Significant Labs:  Recent Results (from the past 72 hour(s))   EKG 12-lead    Collection Time: 04/10/24  6:17 PM   Result Value Ref Range    QRS Duration 124 ms    [facility] QTC Calculation 435 ms   Comprehensive metabolic panel    Collection Time: 04/10/24  6:25 PM   Result Value Ref Range    Sodium Level 135 (L) 136 - 145 mmol/L    Potassium Level 3.4 (L) 3.5 - 5.1 mmol/L    Chloride 105 98 - 107 mmol/L    Carbon Dioxide 22 22 - 29 mmol/L    Glucose Level 176 (H) 74 - 100 mg/dL    Blood Urea Nitrogen 11.7 8.4 - 25.7 mg/dL    Creatinine 0.82 0.73 - 1.18 mg/dL    Calcium Level Total 9.0 8.4 - 10.2 mg/dL    Protein Total 6.8 6.4 - 8.3 gm/dL    Albumin Level 3.6 3.5 - 5.0 g/dL    Globulin 3.2 2.4 - 3.5 gm/dL    Albumin/Globulin Ratio 1.1 1.1 - 2.0 ratio    Bilirubin Total 1.9 (H) <=1.5 mg/dL    Alkaline Phosphatase 116 40 - 150 unit/L    Alanine Aminotransferase 109 (H) 0 - 55 unit/L    Aspartate Aminotransferase 105 (H) 5 - 34 unit/L    eGFR >60 mls/min/1.73/m2   Troponin I    Collection Time: 04/10/24  6:25 PM   Result Value Ref Range    Troponin-I 0.016 0.000 - 0.045 ng/mL   CBC with Differential    Collection Time: 04/10/24  6:25 PM   Result Value Ref Range    WBC 20.16 (H) 4.50 - 11.50 x10(3)/mcL    RBC 5.72 4.70 - 6.10 x10(6)/mcL    Hgb 16.2 14.0 - 18.0 g/dL    Hct 47.7 42.0 - 52.0 %    MCV 83.4 80.0 - 94.0 fL    MCH 28.3 27.0 - 31.0 pg    MCHC 34.0 33.0 - 36.0 g/dL    RDW 13.4 11.5 - 17.0 %    Platelet 183 130 - 400 x10(3)/mcL    MPV 10.1 7.4 - 10.4 fL    Neut % 89.4 %    Lymph % 3.5 %    Mono % 5.6 %    Eos % 0.5 %    Basophil % 0.3 %    Lymph # 0.71 0.6 - 4.6 x10(3)/mcL    Neut # 18.03 (H) 2.1 - 9.2 x10(3)/mcL    Mono # 1.12 0.1 - 1.3 x10(3)/mcL    Eos # 0.10 0 - 0.9 x10(3)/mcL    Baso # 0.06 <=0.2 x10(3)/mcL    IG# 0.14 (H) 0 - 0.04 x10(3)/mcL    IG% 0.7 %    NRBC% 0.0 %   Urinalysis, Reflex to  Urine Culture    Collection Time: 04/10/24  9:02 PM    Specimen: Urine   Result Value Ref Range    Color, UA Orange (A) Yellow, Light-Yellow, Colorless, Straw, Dark-Yellow    Appearance, UA Clear Clear    Specific Gravity, UA 1.037 (H) 1.005 - 1.030    pH, UA 5.5 5.0 - 8.5    Protein, UA 1+ (A) Negative    Glucose, UA Trace (A) Negative, Normal    Ketones, UA Trace (A) Negative    Blood, UA Trace (A) Negative    Bilirubin, UA 1+ (A) Negative    Urobilinogen, UA 4.0 (A) 0.2, 1.0, Normal    Nitrites, UA Negative Negative    Leukocyte Esterase, UA Negative Negative    WBC, UA 0-5 None Seen, 0-2, 3-5, 0-5 /HPF    Bacteria, UA Trace None Seen, Trace /HPF    Squamous Epithelial Cells, UA Trace None Seen /HPF    Mucous, UA Many (A) None Seen /LPF    RBC, UA 0-5 None Seen, 0-2, 3-5, 0-5 /HPF   Lactic Acid, Plasma #1    Collection Time: 04/11/24  1:00 AM   Result Value Ref Range    Lactic Acid Level 2.1 0.5 - 2.2 mmol/L   Lactic Acid, Plasma    Collection Time: 04/11/24  3:29 AM   Result Value Ref Range    Lactic Acid Level 1.4 0.5 - 2.2 mmol/L   Comprehensive Metabolic Panel    Collection Time: 04/11/24  7:05 AM   Result Value Ref Range    Sodium Level 135 (L) 136 - 145 mmol/L    Potassium Level 3.4 (L) 3.5 - 5.1 mmol/L    Chloride 101 98 - 107 mmol/L    Carbon Dioxide 27 22 - 29 mmol/L    Glucose Level 122 (H) 74 - 100 mg/dL    Blood Urea Nitrogen 9.1 8.4 - 25.7 mg/dL    Creatinine 0.82 0.73 - 1.18 mg/dL    Calcium Level Total 8.3 (L) 8.4 - 10.2 mg/dL    Protein Total 5.4 (L) 6.4 - 8.3 gm/dL    Albumin Level 3.0 (L) 3.5 - 5.0 g/dL    Globulin 2.4 2.4 - 3.5 gm/dL    Albumin/Globulin Ratio 1.3 1.1 - 2.0 ratio    Bilirubin Total 2.3 (H) <=1.5 mg/dL    Alkaline Phosphatase 89 40 - 150 unit/L    Alanine Aminotransferase 81 (H) 0 - 55 unit/L    Aspartate Aminotransferase 52 (H) 5 - 34 unit/L    eGFR >60 mls/min/1.73/m2   Magnesium    Collection Time: 04/11/24  7:05 AM   Result Value Ref Range    Magnesium Level 1.40 (L) 1.60 -  2.60 mg/dL   Phosphorus    Collection Time: 04/11/24  7:05 AM   Result Value Ref Range    Phosphorus Level 3.2 2.3 - 4.7 mg/dL   CBC with Differential    Collection Time: 04/11/24  7:05 AM   Result Value Ref Range    WBC 18.98 (H) 4.50 - 11.50 x10(3)/mcL    RBC 4.93 4.70 - 6.10 x10(6)/mcL    Hgb 14.3 14.0 - 18.0 g/dL    Hct 40.6 (L) 42.0 - 52.0 %    MCV 82.4 80.0 - 94.0 fL    MCH 29.0 27.0 - 31.0 pg    MCHC 35.2 33.0 - 36.0 g/dL    RDW 13.7 11.5 - 17.0 %    Platelet 148 130 - 400 x10(3)/mcL    MPV 10.0 7.4 - 10.4 fL    Neut % 86.6 %    Lymph % 6.5 %    Mono % 5.8 %    Eos % 0.2 %    Basophil % 0.2 %    Lymph # 1.23 0.6 - 4.6 x10(3)/mcL    Neut # 16.44 (H) 2.1 - 9.2 x10(3)/mcL    Mono # 1.11 0.1 - 1.3 x10(3)/mcL    Eos # 0.03 0 - 0.9 x10(3)/mcL    Baso # 0.04 <=0.2 x10(3)/mcL    IG# 0.13 (H) 0 - 0.04 x10(3)/mcL    IG% 0.7 %    NRBC% 0.0 %   Troponin I    Collection Time: 04/11/24  7:05 AM   Result Value Ref Range    Troponin-I 0.010 0.000 - 0.045 ng/mL   Brain natriuretic peptide    Collection Time: 04/11/24  7:05 AM   Result Value Ref Range    Natriuretic Peptide 180.6 (H) <=100.0 pg/mL   Echo    Collection Time: 04/11/24  8:57 AM   Result Value Ref Range    BSA 2.32 m2    Coffman's Biplane MOD Ejection Fraction 61 %    LVOT stroke volume 123.04 cm3    LVIDd 6.52 (A) 3.5 - 6.0 cm    LV Systolic Volume 119.00 mL    LV Systolic Volume Index 52.7 mL/m2    LVIDs 5.02 (A) 2.1 - 4.0 cm    LV Diastolic Volume 218.00 mL    LV Diastolic Volume Index 96.46 mL/m2    IVS 1.75 (A) 0.6 - 1.1 cm    LVOT diameter 2.70 cm    LVOT area 5.7 cm2    FS 23 (A) 28 - 44 %    Left Ventricle Relative Wall Thickness 0.40 cm    Posterior Wall 1.30 (A) 0.6 - 1.1 cm    LV mass 499.22 g    LV Mass Index 221 g/m2    MV Peak E Alberto 0.90 m/s    TDI LATERAL 0.10 m/s    TDI SEPTAL 0.06 m/s    E/E' ratio 11.25 m/s    MV Peak A Alberto 0.87 m/s    TR Max Alberto 1.34 m/s    E/A ratio 1.03     E wave deceleration time 215.00 msec    LV SEPTAL E/E' RATIO 15.00 m/s     LV LATERAL E/E' RATIO 9.00 m/s    LVOT peak lisset 1.15 m/s    Left Ventricular Outflow Tract Mean Velocity 0.73 cm/s    Left Ventricular Outflow Tract Mean Gradient 3.00 mmHg    RVDD 2.99 cm    LA size 4.60 cm    LA volume (mod) 53.90 cm3    LA Volume Index (Mod) 23.8 mL/m2    AV mean gradient 10 mmHg    AV peak gradient 17 mmHg    Ao peak lisset 2.07 m/s    Ao VTI 36.80 cm    LVOT peak VTI 21.50 cm    AV valve area 3.34 cm²    AV Velocity Ratio 0.56     AV index (prosthetic) 0.58     CHARLETTE by Velocity Ratio 3.18 cm²    MV mean gradient 3 mmHg    MV peak gradient 5 mmHg    MV stenosis pressure 1/2 time 92.00 ms    MV valve area p 1/2 method 2.39 cm2    MV valve area by continuity eq 3.59 cm2    MV VTI 34.3 cm    Triscuspid Valve Regurgitation Peak Gradient 7 mmHg    PV PEAK VELOCITY 1.22 m/s    PV peak gradient 6 mmHg    Mean e' 0.08 m/s    ZLVIDS 0.00     ZLVIDD -2.40    CV Ultrasound Bilateral Doppler Carotid    Collection Time: 04/11/24  9:03 AM   Result Value Ref Range    Right CCA prox sys 133 cm/s    Right CCA prox steele 14 cm/s    Right CCA dist sys 89 cm/s    Right CCA dist steele 9 cm/s    Right ICA prox sys 92 cm/s    Right ICA prox steele 15 cm/s    Right ICA mid sys 71 cm/s    Right ICA mid steele 21 cm/s    Right ICA dist sys 87 cm/s    Right ICA dist steele 26 cm/s    Right ECA sys 304 cm/s    Right ECA steele 2 cm/s    Right vertebral sys 72 cm/s    Right vertebral steele 10 cm/s    Right ICA/CCA ratio 1.03     Right Highest ICA 92.00     Right Highest EDV 26.00     Right Highest      Left CCA prox sys 126 cm/s    Left CCA prox steele 10 cm/s    Left CCA dist sys 111 cm/s    Left CCA dist steele 18 cm/s    Left ICA prox sys 109 cm/s    Left ICA prox steele 18 cm/s    Left ICA mid sys 114 cm/s    Left ICA mid steele 23 cm/s    Left ICA dist sys 107 cm/s    Left ICA dist steele 6 cm/s    Left ECA sys 200 cm/s    Left ECA steele 2 cm/s    Left vertebral sys 49 cm/s    Left vertebral steele 7 cm/s    Left ICA/CCA ratio 1.03      Left Highest .00     LT Highest EDV 23.00     Left Highest       Significant Imaging:  Imaging Results              US Abdomen Limited (Final result)  Result time 04/11/24 07:01:24      Final result by Fabricio Downing MD (04/11/24 07:01:24)                   Impression:      1. The gallbladder is mildly distended and demonstrates sludge with possible tiny stones in the region of the neck. Similar findings are also noted on the prior examination. The gallbladder wall thickness seems over estimated on the submitted images, measuring 4.4 mm. Sonographic Flores sign is negative on real time evaluation. Indeterminate for cholecystitis. Follow as clinically indicated.    This report is [facility] with [facility].      Electronically signed by: Fabricio Downing MD  Date:    04/11/2024  Time:    07:01               Narrative:    EXAMINATION:  US ABDOMEN LIMITED    CLINICAL HISTORY:  possible cholecystitis on CT;    COMPARISON:  No prior pertinent studies currently available for comparison.    FINDINGS:  Findings: Evaluation is limited due to body habitus.Liver: There is mildly enlarged and measures 18 cm with diffuse fatty infiltration. The liver otherwise appears unremarkable.Biliary ducts: The common bile duct is within normal limits at 3.6 mm in diameter.Gallbladder: The gallbladder is mildly distended and demonstrates sludge with possible tiny stones in the region of the neck. Similar findings are also noted on the prior examination. The gallbladder wall thickness seems over estimated on the submitted images, measuring 4.4 mm. Sonographic Flores sign is negative on real time evaluation. Indeterminate for cholecystitis. Follow as clinically indicated.Pancreas: The pancreas cannot be definitively evaluated due to obscuration by bowel gas.Right kidney:Dimensions: The right kidney measures 12.9 x 6.3 x 8.2 cm and appears unremarkable.Vascular:Portal vein: Flow parameters are within  normal limits with hepatopetal flow.Aorta: The abdominal aorta is obscured due to bowel gas.IVC: The IVC is within normal limits.                        Preliminary result by Edward Flores Jr., MD (04/11/24 04:52:58)                   Impression:    1. The gallbladder is mildly distended and demonstrates sludge with possible tiny stones in the region of the neck. Similar findings are also noted on the prior examination. The gallbladder wall thickness seems over estimated on the submitted images, measuring 4.4 mm. Sonographic Flores sign is negative on real time evaluation. Indeterminate for cholecystitis. Follow as clinically indicated.  2. Details and other findings as noted above.               Narrative:    START OF REPORT:  Technique: Limited right upper quadrant abdominal ultrasound was performed.    Comparison: Comparison is with CT abdomen and pelvis study dated2024-04-11 01:53:45.    Clinical History: ER14 possible cholecystitis seen on CT, CT done same day read by [employer].    Findings: Evaluation is limited due to body habitus.  Liver: There is mildly enlarged and measures 18 cm with diffuse fatty infiltration. The liver otherwise appears unremarkable.  Biliary ducts: The common bile duct is within normal limits at 3.6 mm in diameter.  Gallbladder: The gallbladder is mildly distended and demonstrates sludge with possible tiny stones in the region of the neck. Similar findings are also noted on the prior examination. The gallbladder wall thickness seems over estimated on the submitted images, measuring 4.4 mm. Sonographic Flores sign is negative on real time evaluation. Indeterminate for cholecystitis. Follow as clinically indicated.  Pancreas: The pancreas cannot be definitively evaluated due to obscuration by bowel gas.  Right kidney:  Dimensions: The right kidney measures 12.9 x 6.3 x 8.2 cm and appears unremarkable.  Vascular:  Portal vein: Flow parameters are within normal limits with  hepatopetal flow.  Aorta: The abdominal aorta is obscured due to bowel gas.  IVC: The IVC is within normal limits.                                         CT Abdomen Pelvis With IV Contrast NO Oral Contrast (Final result)  Result time 04/11/24 07:02:22      Final result by Fabricio Downing MD (04/11/24 07:02:22)                   Impression:      1. There is mild gallbladder distension. Moderate dependent hyperdensity is seen in the gallbladder which may represent sludge and/or tiny small varisized stones. There is mild wall thickening and minimal pericholecystic fluid. These may represent beginning or mild cholecystitis. Correlate with clinical and laboratory findings as regards additional evaluation and follow-up.    This report is concordant with the preliminary [email or web address] report.      Electronically signed by: Fabricio Downing MD  Date:    04/11/2024  Time:    07:02               Narrative:    EXAMINATION:  CT ABDOMEN PELVIS WITH IV CONTRAST    CLINICAL HISTORY:  Flank pain, kidney stone suspected;Sepsis;    TECHNIQUE:  Axial imaging of the abdomen and pelvis was performed with axial, sagittal and coronal reconstructions.  IV contrast was administered for this study.  Dynamic and delayed images were obtained.  Automated exposure control was utilized to limit radiation exposure to the patient.    DLP for the study is 642 mgycm.    COMPARISON:  No prior pertinent studies available for comparison.    FINDINGS:  Lungs: There is mild nonspecific dependent change at the lung bases.Pleura: No effusions or thickening are seen.Heart: Cardiomegaly is seen. Scattered coronary artery calcification is seen. Pacer leads are seen in the visualized heart.Abdomen:Abdominal Wall: No abdominal wall pathology is seen.Liver: Mild fatty infiltration of the liver is present.Biliary System: No intrahepatic or extrahepatic biliary duct dilatation is seen.Gallbladder: There is mild gallbladder distension. Moderate dependent  hyperdensity is seen in the gallbladder which may represent sludge and/or tiny small varisized stones. There is mild wall thickening and minimal pericholecystic fluid. These may represent beginning or mild cholecystitis.Pancreas: There is pronounced fatty infiltration of the pancreas.Spleen: A small calcified granulomas are seen in an otherwise unremarkable appearing spleen.Adrenals: The adrenal glands appear unremarkable.Kidneys: The right kidney appears unremarkable with no stones, cysts, masses, or hydronephrosis. The left kidney appears unremarkable with no stones or hydronephrosis. A single cyst measuring 5.8 mm is seen on Image 86, Series 6 mid pole of the left kidney.Aorta: There is moderate calcification of the abdominal aorta and its branches.IVC: Unremarkable.Bowel:Esophagus: The visualized esophagus appears unremarkable.Stomach: The stomach appears unremarkable.Duodenum: Unremarkable appearing duodenum.Small Bowel: The small bowel appears unremarkable.Colon: Nondistended.Appendix: The appendix appears unremarkable and is seen on Image 63, Series 6 through Image 69, Series 6.Peritoneum: No intraperitoneal free air or ascites is seen.Pelvis:Bladder: The bladder is nondistended but appears otherwise unremarkable.Male:Prostate gland: The prostate gland appears unremarkable.Bony structures:Dorsal Spine: There is mild multilevel spondylosis of the visualized dorsal spine.Bony Pelvis: The visualized bony structures of the pelvis appear unremarkable.                        Preliminary result by Edward Flores Jr., MD (04/11/24 02:27:51)                   Impression:    1. There is mild gallbladder distension. Moderate dependent hyperdensity is seen in the gallbladder which may represent sludge and/or tiny small varisized stones. There is mild wall thickening and minimal pericholecystic fluid. These may represent beginning or mild cholecystitis. Correlate with clinical and laboratory findings as regards  additional evaluation and follow-up.  2. Details and other findings as discussed above.               Narrative:    START OF REPORT:  Technique: CT of the abdomen and pelvis was performed with axial images as well as sagittal and coronal reconstruction images with intravenous contrast.    Comparison: None available.    Clinical History: Flank pain, kidney stone suspected, Sepsis.    Dosage Information: Automated Exposure Control was utilized.    Findings:  Lines and Tubes: None.  Thorax:  Lungs: There is mild nonspecific dependent change at the lung bases.  Pleura: No effusions or thickening are seen.  Heart: Cardiomegaly is seen. Scattered coronary artery calcification is seen. Pacer leads are seen in the visualized heart.  Abdomen:  Abdominal Wall: No abdominal wall pathology is seen.  Liver: Mild fatty infiltration of the liver is present.  Biliary System: No intrahepatic or extrahepatic biliary duct dilatation is seen.  Gallbladder: There is mild gallbladder distension. Moderate dependent hyperdensity is seen in the gallbladder which may represent sludge and/or tiny small varisized stones. There is mild wall thickening and minimal pericholecystic fluid. These may represent beginning or mild cholecystitis.  Pancreas: There is pronounced fatty infiltration of the pancreas.  Spleen: A small calcified granulomas are seen in an otherwise unremarkable appearing spleen.  Adrenals: The adrenal glands appear unremarkable.  Kidneys: The right kidney appears unremarkable with no stones, cysts, masses, or hydronephrosis. The left kidney appears unremarkable with no stones or hydronephrosis. A single cyst measuring 5.8 mm is seen on Image 86, Series 6 mid pole of the left kidney.  Aorta: There is moderate calcification of the abdominal aorta and its branches.  IVC: Unremarkable.  Bowel:  Esophagus: The visualized esophagus appears unremarkable.  Stomach: The stomach appears unremarkable.  Duodenum: Unremarkable appearing  duodenum.  Small Bowel: The small bowel appears unremarkable.  Colon: Nondistended.  Appendix: The appendix appears unremarkable and is seen on Image 63, Series 6 through Image 69, Series 6.  Peritoneum: No intraperitoneal free air or ascites is seen.    Pelvis:  Bladder: The bladder is nondistended but appears otherwise unremarkable.  Male:  Prostate gland: The prostate gland appears unremarkable.    Bony structures:  Dorsal Spine: There is mild multilevel spondylosis of the visualized dorsal spine.  Bony Pelvis: The visualized bony structures of the pelvis appear unremarkable.                                         X-Ray Chest AP Portable (Final result)  Result time 04/11/24 08:11:10      Final result by Milad Montana MD (04/11/24 08:11:10)                   Impression:      Increase interstitial markings.    Otherwise no active pulmonary disease      Electronically signed by: Milad Montana  Date:    04/11/2024  Time:    08:11               Narrative:    EXAMINATION:  XR CHEST AP PORTABLE    CLINICAL HISTORY:  Sepsis;    TECHNIQUE:  Single frontal view of the chest was performed.    COMPARISON:  June 22, 2021    FINDINGS:  Examination reveals mediastinal silhouette to be within normal limits cardiac silhouette is not enlarged there is some increase interstitial markings but no evidence of focal consolidative changes atelectases effusions or pneumothoraces                                       CT Head Without Contrast (Final result)  Result time 04/10/24 19:00:50      Final result by Jose Carlos Hubbard MD (04/10/24 19:00:50)                   Impression:      No acute abnormality seen      Electronically signed by: Terrell Hubbard  Date:    04/10/2024  Time:    19:00               Narrative:    EXAMINATION:  CT HEAD WITHOUT CONTRAST    CLINICAL HISTORY:  Syncope, recurrent;    TECHNIQUE:  Multiple axial images were obtained from the base of the brain to the vertex without contrast administration.   Sagittal and coronal reconstructions were performed. .Automatic exposure control  (AEC) is utilized to reduce patient radiation exposure.    COMPARISON:  None    FINDINGS:  There is no intracranial mass or lesion seen.  No hemorrhage is seen.  No infarct is seen.  The ventricles and basilar cisterns appear normal.  Brain parenchyma appears grossly unremarkable.    Posterior fossa appears normal.  The calvarium is intact.  The paranasal sinuses appear grossly unremarkable.                                    Physical Exam  Constitutional:       General: He is not in acute distress.     Appearance: Normal appearance.      Comments: Lying flat without head elevated without distress   HENT:      Head: Normocephalic.      Nose: Nose normal.   Eyes:      Conjunctiva/sclera: Conjunctivae normal.   Cardiovascular:      Rate and Rhythm: Normal rate and regular rhythm.      Pulses: Normal pulses.      Heart sounds: No murmur heard.  Pulmonary:      Effort: Pulmonary effort is normal. No respiratory distress.      Breath sounds: Normal breath sounds.   Abdominal:      General: Abdomen is flat. Bowel sounds are normal. There is no distension.      Palpations: Abdomen is soft.   Musculoskeletal:      Right lower leg: No edema.      Left lower leg: No edema.   Skin:     General: Skin is warm and dry.      Capillary Refill: Capillary refill takes less than 2 seconds.   Neurological:      Mental Status: He is alert.       Home Medications:   No current facility-administered medications on file prior to encounter.     No current outpatient medications on file prior to encounter.     Current Inpatient Medications:    Current Facility-Administered Medications:     acetaminophen tablet 650 mg, 650 mg, Oral, Q6H PRN, Nhi Wong AGACNP-BC    aluminum-magnesium hydroxide-simethicone 200-200-20 mg/5 mL suspension 30 mL, 30 mL, Oral, QID PRN, Nhi Wong AGACNP-BC    dextrose 10% bolus 125 mL 125 mL, 12.5 g, Intravenous, PRN,  Roz Ovalle, LUIS A    dextrose 10% bolus 250 mL 250 mL, 25 g, Intravenous, PRN, Roz Ovalle PA-C    enoxaparin injection 40 mg, 40 mg, Subcutaneous, Daily, Nhi Wong AGACNP-BC    glucagon (human recombinant) injection 1 mg, 1 mg, Intramuscular, PRN, Roz Ovalle PA-JUAN R    insulin aspart U-100 injection 0-10 Units, 0-10 Units, Subcutaneous, Q6H PRN, Roz Ovalle PA-C    melatonin tablet 6 mg, 6 mg, Oral, Nightly PRN, Nhi Wong AGACNP-BC    naloxone 0.4 mg/mL injection 0.02 mg, 0.02 mg, Intravenous, PRN, Nhi Wong AGACNP-BC    ondansetron injection 4 mg, 4 mg, Intravenous, Q4H PRN, Nhi Wong AGACNP-BC    piperacillin-tazobactam (ZOSYN) 4.5 g in dextrose 5 % in water (D5W) 100 mL IVPB (MB+), 4.5 g, Intravenous, Q8H, Aleksandra Flynn MD, Last Rate: 25 mL/hr at 04/11/24 0829, 4.5 g at 04/11/24 0829    polyethylene glycol packet 17 g, 17 g, Oral, BID PRN, Nhi Wong AGACNP-BC    prochlorperazine injection Soln 5 mg, 5 mg, Intravenous, Q6H PRN, Nhi Wong AGACNP-BC    simethicone chewable tablet 80 mg, 1 tablet, Oral, QID PRN, Nhi Wong AGACNP-BC    vancomycin - pharmacy to dose, , Intravenous, pharmacy to manage frequency, Nhi Wong AGACNP-BC    vancomycin 1.75 g in 5 % dextrose 500 mL IVPB, 1,750 mg, Intravenous, Q12H, Nhi Wong AGACNP-BC  No current outpatient medications on file.  VTE Risk Mitigation (From admission, onward)           Ordered     enoxaparin injection 40 mg  Daily         04/11/24 0656     IP VTE HIGH RISK PATIENT  Once         04/11/24 0656     Place sequential compression device  Until discontinued         04/11/24 0656                  Assessment:     Severe multivessel coronary artery disease with previous MI and multiple stent placement and CABGx4 2021  Ischemic cardiomyopathy ejection fraction 30 to 35% with AICD Hypertension   T2DM  CVA     Plan:     - Echo done today, pending  - Revised Cardiac Risk  Index: 4, BNP >92. 15% risk of major cardiac event. No active angina.  - Abbasi Perioperative Risk: 0.2%  - Continue optimization of medical therapies (ie. GDMT) as appropriate    Cardiology signing off  Thank you for your consult.     Josefina Varner MD  Cardiology  [facility] - Emergency Dept  04/11/2024    I agree with the findings of the complexity of problems addressed and take responsibility for the plan's risks and complications. I approved the plan documented by Justin Varner MD  Proceed with surgery if needed

## 2024-04-11 NOTE — NURSING
Nurses Note -- 4 Eyes      4/11/2024   6:38 PM      Skin assessed during: Admit      [x] No Altered Skin Integrity Present    [x]Prevention Measures Documented      [] Yes- Altered Skin Integrity Present or Discovered   [] LDA Added if Not in Epic (Describe Wound)   [] New Altered Skin Integrity was Present on Admit and Documented in LDA   [] Wound Image Taken    Wound Care Consulted? No    Attending Nurse:  JUAN Salazar    Second RN/Staff Member:   JUAN Sabillon

## 2024-04-12 LAB
ALBUMIN SERPL-MCNC: 2.7 G/DL (ref 3.5–5)
ALBUMIN/GLOB SERPL: 0.9 RATIO (ref 1.1–2)
ALP SERPL-CCNC: 95 UNIT/L (ref 40–150)
ALT SERPL-CCNC: 68 UNIT/L (ref 0–55)
AST SERPL-CCNC: 40 UNIT/L (ref 5–34)
BASOPHILS # BLD AUTO: 0.03 X10(3)/MCL
BASOPHILS NFR BLD AUTO: 0.3 %
BILIRUB SERPL-MCNC: 1 MG/DL
BUN SERPL-MCNC: 7.5 MG/DL (ref 8.4–25.7)
CALCIUM SERPL-MCNC: 8.4 MG/DL (ref 8.4–10.2)
CHLORIDE SERPL-SCNC: 103 MMOL/L (ref 98–107)
CO2 SERPL-SCNC: 24 MMOL/L (ref 22–29)
CREAT SERPL-MCNC: 0.73 MG/DL (ref 0.73–1.18)
EOSINOPHIL # BLD AUTO: 0.18 X10(3)/MCL (ref 0–0.9)
EOSINOPHIL NFR BLD AUTO: 2 %
ERYTHROCYTE [DISTWIDTH] IN BLOOD BY AUTOMATED COUNT: 13.5 % (ref 11.5–17)
GFR SERPLBLD CREATININE-BSD FMLA CKD-EPI: >60 MLS/MIN/1.73/M2
GLOBULIN SER-MCNC: 3 GM/DL (ref 2.4–3.5)
GLUCOSE SERPL-MCNC: 243 MG/DL (ref 74–100)
HAV IGM SERPL QL IA: NONREACTIVE
HBV CORE IGM SERPL QL IA: NONREACTIVE
HBV SURFACE AG SERPL QL IA: NONREACTIVE
HCT VFR BLD AUTO: 41.7 % (ref 42–52)
HCV AB SERPL QL IA: NONREACTIVE
HGB BLD-MCNC: 14.2 G/DL (ref 14–18)
IMM GRANULOCYTES # BLD AUTO: 0.04 X10(3)/MCL (ref 0–0.04)
IMM GRANULOCYTES NFR BLD AUTO: 0.4 %
INR PPP: 1.1
LEFT CCA DIST DIAS: 18 CM/S
LEFT CCA DIST SYS: 111 CM/S
LEFT CCA PROX DIAS: 10 CM/S
LEFT CCA PROX SYS: 126 CM/S
LEFT ECA DIAS: 2 CM/S
LEFT ECA SYS: 200 CM/S
LEFT ICA DIST DIAS: 6 CM/S
LEFT ICA DIST SYS: 107 CM/S
LEFT ICA MID DIAS: 23 CM/S
LEFT ICA MID SYS: 114 CM/S
LEFT ICA PROX DIAS: 18 CM/S
LEFT ICA PROX SYS: 109 CM/S
LEFT VERTEBRAL DIAS: 7 CM/S
LEFT VERTEBRAL SYS: 49 CM/S
LYMPHOCYTES # BLD AUTO: 1.49 X10(3)/MCL (ref 0.6–4.6)
LYMPHOCYTES NFR BLD AUTO: 16.1 %
MAGNESIUM SERPL-MCNC: 2.1 MG/DL (ref 1.6–2.6)
MCH RBC QN AUTO: 28.5 PG (ref 27–31)
MCHC RBC AUTO-ENTMCNC: 34.1 G/DL (ref 33–36)
MCV RBC AUTO: 83.6 FL (ref 80–94)
MONOCYTES # BLD AUTO: 0.8 X10(3)/MCL (ref 0.1–1.3)
MONOCYTES NFR BLD AUTO: 8.7 %
NEUTROPHILS # BLD AUTO: 6.69 X10(3)/MCL (ref 2.1–9.2)
NEUTROPHILS NFR BLD AUTO: 72.5 %
NRBC BLD AUTO-RTO: 0 %
OHS CV CAROTID RIGHT ICA EDV HIGHEST: 26
OHS CV CAROTID ULTRASOUND LEFT ICA/CCA RATIO: 1.03
OHS CV CAROTID ULTRASOUND RIGHT ICA/CCA RATIO: 1.03
OHS CV PV CAROTID LEFT HIGHEST CCA: 126
OHS CV PV CAROTID LEFT HIGHEST ICA: 114
OHS CV PV CAROTID RIGHT HIGHEST CCA: 133
OHS CV PV CAROTID RIGHT HIGHEST ICA: 92
OHS CV US CAROTID LEFT HIGHEST EDV: 23
PHOSPHATE SERPL-MCNC: 2.3 MG/DL (ref 2.3–4.7)
PLATELET # BLD AUTO: 141 X10(3)/MCL (ref 130–400)
PMV BLD AUTO: 10.4 FL (ref 7.4–10.4)
POCT GLUCOSE: 151 MG/DL (ref 70–110)
POCT GLUCOSE: 292 MG/DL (ref 70–110)
POTASSIUM SERPL-SCNC: 3.2 MMOL/L (ref 3.5–5.1)
PROT SERPL-MCNC: 5.7 GM/DL (ref 6.4–8.3)
PROTHROMBIN TIME: 14.1 SECONDS (ref 12.5–14.5)
RBC # BLD AUTO: 4.99 X10(6)/MCL (ref 4.7–6.1)
RIGHT CCA DIST DIAS: 9 CM/S
RIGHT CCA DIST SYS: 89 CM/S
RIGHT CCA PROX DIAS: 14 CM/S
RIGHT CCA PROX SYS: 133 CM/S
RIGHT ECA DIAS: 2 CM/S
RIGHT ECA SYS: 304 CM/S
RIGHT ICA DIST DIAS: 26 CM/S
RIGHT ICA DIST SYS: 87 CM/S
RIGHT ICA MID DIAS: 21 CM/S
RIGHT ICA MID SYS: 71 CM/S
RIGHT ICA PROX DIAS: 15 CM/S
RIGHT ICA PROX SYS: 92 CM/S
RIGHT VERTEBRAL DIAS: 10 CM/S
RIGHT VERTEBRAL SYS: 72 CM/S
SODIUM SERPL-SCNC: 135 MMOL/L (ref 136–145)
VANCOMYCIN TROUGH SERPL-MCNC: 10.9 UG/ML (ref 15–20)
WBC # SPEC AUTO: 9.23 X10(3)/MCL (ref 4.5–11.5)

## 2024-04-12 PROCEDURE — 85610 PROTHROMBIN TIME: CPT | Performed by: PHYSICIAN ASSISTANT

## 2024-04-12 PROCEDURE — 63600175 PHARM REV CODE 636 W HCPCS: Performed by: NURSE PRACTITIONER

## 2024-04-12 PROCEDURE — 63600175 PHARM REV CODE 636 W HCPCS: Performed by: PHYSICIAN ASSISTANT

## 2024-04-12 PROCEDURE — A9537 TC99M MEBROFENIN: HCPCS | Performed by: INTERNAL MEDICINE

## 2024-04-12 PROCEDURE — 80202 ASSAY OF VANCOMYCIN: CPT | Performed by: INTERNAL MEDICINE

## 2024-04-12 PROCEDURE — 80053 COMPREHEN METABOLIC PANEL: CPT | Performed by: NURSE PRACTITIONER

## 2024-04-12 PROCEDURE — 84100 ASSAY OF PHOSPHORUS: CPT | Performed by: NURSE PRACTITIONER

## 2024-04-12 PROCEDURE — 85025 COMPLETE CBC W/AUTO DIFF WBC: CPT | Performed by: NURSE PRACTITIONER

## 2024-04-12 PROCEDURE — 25000003 PHARM REV CODE 250: Performed by: NURSE PRACTITIONER

## 2024-04-12 PROCEDURE — 63600175 PHARM REV CODE 636 W HCPCS: Performed by: INTERNAL MEDICINE

## 2024-04-12 PROCEDURE — 25000003 PHARM REV CODE 250: Performed by: INTERNAL MEDICINE

## 2024-04-12 PROCEDURE — 83735 ASSAY OF MAGNESIUM: CPT | Performed by: NURSE PRACTITIONER

## 2024-04-12 PROCEDURE — 21400001 HC TELEMETRY ROOM

## 2024-04-12 RX ORDER — HYDROCODONE BITARTRATE AND ACETAMINOPHEN 5; 325 MG/1; MG/1
1 TABLET ORAL EVERY 4 HOURS PRN
Status: DISCONTINUED | OUTPATIENT
Start: 2024-04-12 | End: 2024-04-13 | Stop reason: HOSPADM

## 2024-04-12 RX ORDER — KIT FOR THE PREPARATION OF TECHNETIUM TC 99M MEBROFENIN 45 MG/10ML
8.8 INJECTION, POWDER, LYOPHILIZED, FOR SOLUTION INTRAVENOUS
Status: COMPLETED | OUTPATIENT
Start: 2024-04-12 | End: 2024-04-12

## 2024-04-12 RX ADMIN — PIPERACILLIN SODIUM AND TAZOBACTAM SODIUM 4.5 G: 4; .5 INJECTION, POWDER, LYOPHILIZED, FOR SOLUTION INTRAVENOUS at 12:04

## 2024-04-12 RX ADMIN — VANCOMYCIN HYDROCHLORIDE 1750 MG: 750 INJECTION, POWDER, LYOPHILIZED, FOR SOLUTION INTRAVENOUS at 02:04

## 2024-04-12 RX ADMIN — INSULIN ASPART 6 UNITS: 100 INJECTION, SOLUTION INTRAVENOUS; SUBCUTANEOUS at 06:04

## 2024-04-12 RX ADMIN — HYDROCODONE BITARTRATE AND ACETAMINOPHEN 1 TABLET: 5; 325 TABLET ORAL at 08:04

## 2024-04-12 RX ADMIN — KIT FOR THE PREPARATION OF TECHNETIUM TC 99M MEBROFENIN 8.8 MILLICURIE: 45 INJECTION, POWDER, LYOPHILIZED, FOR SOLUTION INTRAVENOUS at 08:04

## 2024-04-12 RX ADMIN — HYDROCODONE BITARTRATE AND ACETAMINOPHEN 1 TABLET: 5; 325 TABLET ORAL at 03:04

## 2024-04-12 RX ADMIN — ATORVASTATIN CALCIUM 40 MG: 40 TABLET, FILM COATED ORAL at 11:04

## 2024-04-12 RX ADMIN — AMLODIPINE BESYLATE 5 MG: 5 TABLET ORAL at 11:04

## 2024-04-12 RX ADMIN — INSULIN ASPART 3 UNITS: 100 INJECTION, SOLUTION INTRAVENOUS; SUBCUTANEOUS at 11:04

## 2024-04-12 RX ADMIN — CARVEDILOL 25 MG: 12.5 TABLET, FILM COATED ORAL at 11:04

## 2024-04-12 RX ADMIN — SACUBITRIL AND VALSARTAN 1 TABLET: 97; 103 TABLET, FILM COATED ORAL at 08:04

## 2024-04-12 RX ADMIN — ASPIRIN 81 MG: 81 TABLET, COATED ORAL at 11:04

## 2024-04-12 RX ADMIN — CARVEDILOL 25 MG: 12.5 TABLET, FILM COATED ORAL at 06:04

## 2024-04-12 RX ADMIN — INSULIN ASPART 4 UNITS: 100 INJECTION, SOLUTION INTRAVENOUS; SUBCUTANEOUS at 05:04

## 2024-04-12 RX ADMIN — PIPERACILLIN SODIUM AND TAZOBACTAM SODIUM 4.5 G: 4; .5 INJECTION, POWDER, LYOPHILIZED, FOR SOLUTION INTRAVENOUS at 08:04

## 2024-04-12 RX ADMIN — SACUBITRIL AND VALSARTAN 1 TABLET: 97; 103 TABLET, FILM COATED ORAL at 11:04

## 2024-04-12 NOTE — PROGRESS NOTES
[facility]  Hospital Medicine Progress Note        A. History:   54-year-old male with known past medical history of hypertension, hyperlipidemia, congestive heart failure with EF 30-35% with AICD, history of coronary artery disease status post CABG x4 and PCI, cardiac arrest, diabetes mellitus type 2 admitted 04/11/2024 after having 4-5 syncopal episodes yesterday.  Reports he did not fall or hit his head but unsure how long he was out.  Last remember was when he was going to the restroom while using it, he passed out and woke up sitting on the toilet seat. Patient reports he did felt weak, nauseous, lightheaded prior to passing out.  Patient also complained of abdominal been and also back pain.  Patient denies chest pain, shortness on breath, fever chills.  He does not know the name of his PCP, his cardiologist, any of his medication other than glipizide which he said he stopped taking it given it was making him sick.  Reported his PCP is aware  Patient is not sure if he takes Plavix or not  Patient is asking for something to eat or drink.  Informed that surgery will come see him and that surgery has consulted Cardiology for cardiac clearance as well in case he needs surgery.  Patient reported he has not eaten in 2 days  I reviewed patient's chart, imaging, vital signs, ED note  Did noted he has spiked a fever of 101°, patient has been empirically started on Zosyn     Patient presented with complaints of having syncope and was found to be in sepsis due to acute cholecystitis.  General surgery initial recommendations of no interventions.  HIDA scan is suggestive of acute cholecystitis.  Will reinforce General surgery.  Patient currently on IV Zosyn and IV fluids     Today's information   Patient seen and examined at bedside, no family member at bedside   Vitals reviewed and stable on room air, afebrile  Patient complaining of back pain at bedside   Patient is strongly feels his kidneys  is the cause of the problem however his kidneys look fine on CT abdomen and pelvis in UA with no hematuria  Potassium 3.2, magnesium 2.1, AST 40, ALT 68, improving slightly, bilirubin 2.3  Alkaline phosphatase is normal   Blood cultures negative at 24 hours     B. Physical exam:  Vitals reviewed.  General: In no acute distress, afebrile currently, obese,   Chest: Clear to auscultation bilaterally anteriorly  Heart: S1, S2, no appreciable murmur  Abdomen obese, protuberant, BS +  MSK: Warm, no lower extremity edema, no clubbing or cyanosis  Neurologic: Alert and oriented x4, moving all extremities with good strength      Assessment/plan  Acute cholecystitis  Gallbladder sludge with transaminitis and hyperbilirubinemia   Leukocytosis probably due to above , resolved  Sepsis, resolved   Fever, resolved   Mild hypokalemia, resolved   Severe hypomagnesemia, resolved   Tobacco use  History of  hypertension, hyperlipidemia, diabetes mellitus type 2  ICMO with EF 30-35% with A1CD, coronary disease status post CABG x4 in 2021, hx of cardiac arrest,  Syncope      Plan  Continue IV Zosyn and discontinue vancomycin   Re-consult General surgery that HIDA scan is positive   General surgery evaluated the patient, recommended no acute surgical intervention at this time  Follow-up with GI recs   Fever white cell count has resolved   Liver enzymes is improving slowly   Discontinue Lasix and discontinue IV fluids   Patient can resume home dose Lasix  on discharge   Carotid ultrasound-Preliminary report revealed less than 50% stenosis bilateral ICA, bilateral vertebral arteries with patent antegrade flow  Echo- technically difficult study, low normal EF of 50-55%, grade 1 diastolic dysfunction, much improved when compared to EF noted on [facility] on 04/30/2019 which was 30-35%  Continue Telemetry monitoring   Accu-Cheks and sliding scale  Insulin sliding scale  Resume appropriate home medications once updated, requested ED nurse to complete  the [facility] as patient has his medication bottles with him       VTE Prophylaxis:    SCDs     Patient condition:   Fair      Discharge Planning and Disposition/Anticipated discharge: TBD     If patient was admitted under observational status it is with my approval/permission.         All diagnosis and differential diagnosis have been reviewed; at least 55 min was spent on this history and physical exam, assessment and plan has been documented; I have personally reviewed the labs and test results that are presently available; I have reviewed the patients medication list; I have reviewed the consulting providers response and recommendations. I have reviewed or attempted to review medical records based upon their availability.    All of the patient and family questions have been addressed and answered. Patient's is agreeable to the above stated plan. I will continue to monitor closely and make adjustments to medical management as needed.     Portions of this note dictated using EMR integrated voice recognition software, and may be subject to voice recognition errors not corrected at proofreading. Please contact writer for clarification if needed      VITAL SIGNS: 24 HRS MIN & MAX LAST   Temp  Min: 97.9 °F (36.6 °C)  Max: 99.6 °F (37.6 °C) 98.5 °F (36.9 °C)   BP  Min: 109/68  Max: 154/75 (!) 146/93   Pulse  Min: 55  Max: 66  63   Resp  Min: 13  Max: 20 18   SpO2  Min: 95 %  Max: 97 % 96 %     I have reviewed the following labs:  Recent Labs   Lab 04/10/24  1825 04/11/24  0705 04/12/24  0410   WBC 20.16* 18.98* 9.23   RBC 5.72 4.93 4.99   HGB 16.2 14.3 14.2   HCT 47.7 40.6* 41.7*   MCV 83.4 82.4 83.6   MCH 28.3 29.0 28.5   MCHC 34.0 35.2 34.1   RDW 13.4 13.7 13.5    148 141   MPV 10.1 10.0 10.4     Recent Labs   Lab 04/10/24  1825 04/11/24  0705 04/12/24  0410   * 135* 135*   K 3.4* 3.4* 3.2*   CO2 22 27 24   BUN 11.7 9.1 7.5*   CREATININE 0.82 0.82 0.73   CALCIUM 9.0 8.3* 8.4   MG  --  1.40* 2.10   ALBUMIN  3.6 3.0* 2.7*   ALKPHOS 116 89 95   * 81* 68*   * 52* 40*   BILITOT 1.9* 2.3*  2.3* 1.0     Microbiology Results (last 7 days)       Procedure Component Value Units Date/Time    Blood culture x two cultures. Draw prior to antibiotics. [[id number]]  (Normal) Collected: 04/11/24 0100    Order Status: Completed Specimen: Blood Updated: 04/12/24 0300     CULTURE, BLOOD ([facility]) No Growth At 24 Hours    Blood culture x two cultures. Draw prior to antibiotics. [[id number]]  (Normal) Collected: 04/11/24 0100    Order Status: Completed Specimen: Blood Updated: 04/12/24 0300     CULTURE, BLOOD ([facility]) No Growth At 24 Hours             See below for Radiology    Scheduled Med:   amLODIPine  5 mg Oral Daily    aspirin  81 mg Oral Daily    atorvastatin  40 mg Oral Daily    carvediloL  25 mg Oral BID WM    enoxparin  40 mg Subcutaneous Daily    piperacillin-tazobactam (Zosyn) IV (PEDS and ADULTS) (extended infusion is not appropriate)  4.5 g Intravenous Q8H    sacubitriL-valsartan  1 tablet Oral BID    vancomycin (VANCOCIN) IV (PEDS and ADULTS)  1,500 mg Intravenous Q8H      Continuous Infusions:     PRN Meds:  acetaminophen, aluminum-magnesium hydroxide-simethicone, dextrose 10%, dextrose 10%, glucagon (human recombinant), insulin aspart U-100, melatonin, naloxone, ondansetron, polyethylene glycol, prochlorperazine, simethicone, vancomycin - pharmacy to dose     Assessment/Plan:      VTE prophylaxis:     Patient condition:  Stable/Fair/Guarded/ Serious/ Critical    Anticipated discharge and Disposition:         All diagnosis and differential diagnosis have been reviewed; assessment and plan has been documented; I have personally reviewed the labs and test results that are presently available; I have reviewed the patients medication list; I have reviewed the consulting providers response and recommendations. I have reviewed or attempted to review medical records based upon their availability    All of the patient's  questions have been  addressed and answered. Patient's is agreeable to the above stated plan. I will continue to monitor closely and make adjustments to medical management as needed.  _____________________________________________________________________    Nutrition Status:    Radiology:  I have personally reviewed the following imaging and agree with the radiologist.     NM Hepatobiliary Scan (HIDA)  Narrative: EXAMINATION:  NM HEPATOBILIARY IMAGING INC GB (HIDA)    CLINICAL HISTORY:  Cholelithiasis;elevated LFTs, evaluate for cholecystitis;    TECHNIQUE:  8.8 mCi of intravenous Choletec was administered followed by focused gamma camera imaging of the abdomen.    COMPARISON:  Ultrasound abdomen and CT abdomen April 11, 2024.    FINDINGS:  There is prompt hepatocellular uptake.  Small bowel activity is identified by 15 minutes progressing through 60 minutes of imaging.    There was no accumulation of radioisotope within the gallbladder lumen during initial 60 minutes of scintigraphy.  There was again no gallbladder uptake on the delayed scintigraphy performed at 2 hours and 4 hours.  Impression: Gallbladder lack of radioisotope activity during 4 hours of scintigraphy reflects acute cholecystitis.  Please correlate clinically.    Electronically signed by: Ishmael Rossi  Date:    04/12/2024  Time:    13:03  CV Ultrasound Bilateral Doppler Carotid  The right internal carotid artery demonstrated less than 50% stenosis.  The left internal carotid artery demonstrated less than 50% stenosis.  Bilateral vertebral arteries were patent with antegrade flow.      Corby Steen MD  Department of Hospital Medicine   [facility]   04/12/2024

## 2024-04-12 NOTE — PROGRESS NOTES
Pharmacokinetic Assessment Follow Up: IV Vancomycin    Vancomycin serum concentration assessment(s):    The trough level was drawn correctly and can be used to guide therapy at this time. The measurement is below the desired definitive target range of 15 to 20 mcg/mL.    Vancomycin Regimen Plan:    Change regimen to Vancomycin 1500 mg IV every 8 hours with next serum trough concentration measured at 1300 prior to 4th dose on 04/13.    Drug levels (last 3 results):  Recent Labs   Lab Result Units 04/12/24  1229   Vancomycin Trough ug/ml 10.9*       Pharmacy will continue to follow and monitor vancomycin.    Please contact pharmacy at extension [phone] for questions regarding this assessment.    Thank you for the consult,   Lg Torres       Patient brief summary:  Manas Felix Jr. is a 54 y.o. male initiated on antimicrobial therapy with IV Vancomycin for treatment of sepsis    The patient's current regimen is 1750mg q12h.    Drug Allergies:   Review of patient's allergies indicates:   Allergen Reactions    Demerol [meperidine] Swelling       Actual Body Weight:   108.9kg    Renal Function:   Estimated Creatinine Clearance: 146.3 mL/min (based on SCr of 0.73 mg/dL).,     Dialysis Method (if applicable):  N/A    CBC (last 72 hours):  Recent Labs   Lab Result Units 04/10/24  1825 04/11/24  0705 04/11/24  1519 04/12/24  0410   WBC x10(3)/mcL 20.16* 18.98*  --  9.23   Hgb g/dL 16.2 14.3  --  14.2   Hemoglobin A1c %  --   --  8.5*  --    Hct % 47.7 40.6*  --  41.7*   Platelet x10(3)/mcL 183 148  --  141   Mono % % 5.6 5.8  --  8.7   Eos % % 0.5 0.2  --  2.0   Basophil % % 0.3 0.2  --  0.3       Metabolic Panel (last 72 hours):  Recent Labs   Lab Result Units 04/10/24  1825 04/10/24  2102 04/11/24  0705 04/12/24  0410   Sodium Level mmol/L 135*  --  135* 135*   Potassium Level mmol/L 3.4*  --  3.4* 3.2*   Chloride mmol/L 105  --  101 103   Carbon Dioxide mmol/L 22  --  27 24   Glucose Level mg/dL 176*  --  122* 243*    Glucose, UA   --  Trace*  --   --    Blood Urea Nitrogen mg/dL 11.7  --  9.1 7.5*   Creatinine mg/dL 0.82  --  0.82 0.73   Albumin Level g/dL 3.6  --  3.0* 2.7*   Bilirubin Total mg/dL 1.9*  --  2.3*  2.3* 1.0   Alkaline Phosphatase unit/L 116  --  89 95   Aspartate Aminotransferase unit/L 105*  --  52* 40*   Alanine Aminotransferase unit/L 109*  --  81* 68*   Magnesium Level mg/dL  --   --  1.40* 2.10   Phosphorus Level mg/dL  --   --  3.2 2.3       Vancomycin Administrations:  vancomycin given in the last 96 hours                     vancomycin 1.75 g in 5 % dextrose 500 mL IVPB (mg) 1,750 mg New Bag 04/12/24 0241     1,750 mg New Bag 04/11/24 1400    vancomycin (VANCOCIN) 2,500 mg in dextrose 5 % (D5W) 500 mL IVPB (mg) 2,500 mg New Bag 04/11/24 0200                    Microbiologic Results:  Microbiology Results (last 7 days)       Procedure Component Value Units Date/Time    Blood culture x two cultures. Draw prior to antibiotics. [[id number]]  (Normal) Collected: 04/11/24 0100    Order Status: Completed Specimen: Blood Updated: 04/12/24 0300     CULTURE, BLOOD ([facility]) No Growth At 24 Hours    Blood culture x two cultures. Draw prior to antibiotics. [[id number]]  (Normal) Collected: 04/11/24 0100    Order Status: Completed Specimen: Blood Updated: 04/12/24 0300     CULTURE, BLOOD ([facility]) No Growth At 24 Hours
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
## 2024-04-12 NOTE — PROGRESS NOTES
"   Acute Care Surgery   Progress Note  Admit Date: 4/10/2024  HD#1  POD#* No surgery found *    Subjective:   Interval history:  Tmax 101 yesterday morning, afebrile since. Denies pain this morning. No N/V.    Home Meds:  Current Outpatient Medications   Medication Instructions    amLODIPine (NORVASC) 10 mg, Oral, Daily    aspirin (ECOTRIN) 81 mg, Oral, Daily    carvediloL (COREG) 25 mg, Oral, 2 times daily with meals    furosemide (LASIX) 40 mg, Oral, Daily    rosuvastatin (CRESTOR) 40 mg, Oral, Nightly    sacubitriL-valsartan (ENTRESTO)  mg per tablet 1 tablet, Oral, 2 times daily      Scheduled Meds:   amLODIPine  5 mg Oral Daily    aspirin  81 mg Oral Daily    atorvastatin  40 mg Oral Daily    carvediloL  25 mg Oral BID WM    enoxparin  40 mg Subcutaneous Daily    piperacillin-tazobactam (Zosyn) IV (PEDS and ADULTS) (extended infusion is not appropriate)  4.5 g Intravenous Q8H    sacubitriL-valsartan  1 tablet Oral BID    vancomycin (VANCOCIN) IV (PEDS and ADULTS)  1,500 mg Intravenous Q8H     Continuous Infusions:  PRN Meds:acetaminophen, aluminum-magnesium hydroxide-simethicone, dextrose 10%, dextrose 10%, glucagon (human recombinant), insulin aspart U-100, melatonin, naloxone, ondansetron, polyethylene glycol, prochlorperazine, simethicone, vancomycin - pharmacy to dose     Objective:     VITAL SIGNS: 24 HR MIN & MAX LAST   Temp  Min: 97.9 °F (36.6 °C)  Max: 99.6 °F (37.6 °C)  98.5 °F (36.9 °C)   BP  Min: 109/68  Max: 154/75  (!) 146/93    Pulse  Min: 55  Max: 66  63    Resp  Min: 13  Max: 20  18    SpO2  Min: 95 %  Max: 97 %  96 %      HT: 5' 10" (177.8 cm)  WT: 108.9 kg (240 lb)  BMI: 34.4     Intake/output:  Intake/Output - Last 3 Shifts         04/10 0700  04/11 0659 04/11 0700  04/12 0659 04/12 0700 04/13 0659    I.V. (mL/kg) 4000 (36.7)      Total Intake(mL/kg) 4000 (36.7)      Urine (mL/kg/hr) 1500 500 (0.2)     Total Output 1500 500     Net +2500 -500                    Intake/Output Summary " (Last 24 hours) at 4/12/2024 1350  Last data filed at 4/11/2024 1622  Gross per 24 hour   Intake --   Output 500 ml   Net -500 ml           Lines/drains/airway:       Peripheral IV - Single Lumen 04/11/24 0058 20 G Right Antecubital (Active)   Site Assessment Clean;Dry;Intact 04/12/24 0800   Extremity Assessment Distal to IV No abnormal discoloration;No redness;No swelling;No warmth 04/11/24 2000   Line Status Saline locked 04/11/24 2000   Dressing Status Clean;Dry;Intact 04/12/24 0800   Dressing Intervention Integrity maintained 04/11/24 1800   Number of days: 1            Peripheral IV - Single Lumen 04/11/24 0050 18 G Left Antecubital (Active)   Site Assessment Clean;Dry;Intact 04/12/24 0800   Extremity Assessment Distal to IV No abnormal discoloration;No redness;No swelling;No warmth 04/11/24 2000   Line Status Saline locked 04/11/24 2000   Dressing Status Clean;Dry;Intact 04/12/24 0800   Dressing Intervention Integrity maintained 04/11/24 1800   Number of days: 1       Physical examination:  Gen: NAD, AAOx3, answering questions appropriately  HEENT: NCAT  CV: RRR  Resp: NWOB on room air  Abd: Soft, mildly tender to palpation in RUQ, no guarding or rigidity  Ext: moving all extremities spontaneously and purposefully  Neuro: CN II-XII grossly intact    Labs:  Renal:  Recent Labs     04/10/24  1825 04/11/24  0705 04/12/24  0410   BUN 11.7 9.1 7.5*   CREATININE 0.82 0.82 0.73     Recent Labs     04/11/24  0100 04/11/24  0329   LACTIC 2.1 1.4     FENGI:  Recent Labs     04/10/24  1825 04/11/24  0705 04/12/24  0410   * 135* 135*   K 3.4* 3.4* 3.2*   CO2 22 27 24   CALCIUM 9.0 8.3* 8.4   MG  --  1.40* 2.10   PHOS  --  3.2 2.3   ALBUMIN 3.6 3.0* 2.7*   BILITOT 1.9* 2.3*  2.3* 1.0   * 52* 40*   ALKPHOS 116 89 95   * 81* 68*     Heme:  Recent Labs     04/10/24  1825 04/11/24  0705 04/12/24  0410   HGB 16.2 14.3 14.2   HCT 47.7 40.6* 41.7*    148 141   INR  --   --  1.1     ID:  Recent Labs      04/10/24  1825 04/11/24  0705 04/12/24  0410   WBC 20.16* 18.98* 9.23     CBG:  Recent Labs     04/10/24  1825 04/11/24  0705 04/12/24  0410   GLUCOSE 176* 122* 243*      Cardiovascular:  Recent Labs   Lab 04/10/24  1825 04/11/24  0705   TROPONINI 0.016 0.010   BNP  --  180.6*     I have reviewed all pertinent lab results within the past 24 hours.    Imaging:  NM Hepatobiliary Scan (HIDA)   Final Result      Gallbladder lack of radioisotope activity during 4 hours of scintigraphy reflects acute cholecystitis.  Please correlate clinically.         Electronically signed by: Ishmael Rossi   Date:    04/12/2024   Time:    13:03      US Abdomen Limited   Final Result      1. The gallbladder is mildly distended and demonstrates sludge with possible tiny stones in the region of the neck. Similar findings are also noted on the prior examination. The gallbladder wall thickness seems over estimated on the submitted images, measuring 4.4 mm. Sonographic Flores sign is negative on real time evaluation. Indeterminate for cholecystitis. Follow as clinically indicated.      This report is concordant with the preliminary [facility] report.         Electronically signed by: Fabricio Downing MD   Date:    04/11/2024   Time:    07:01      CT Abdomen Pelvis With IV Contrast NO Oral Contrast   Final Result      1. There is mild gallbladder distension. Moderate dependent hyperdensity is seen in the gallbladder which may represent sludge and/or tiny small varisized stones. There is mild wall thickening and minimal pericholecystic fluid. These may represent beginning or mild cholecystitis. Correlate with clinical and laboratory findings as regards additional evaluation and follow-up.      This report is concordant with the preliminary [facility] report.         Electronically signed by: Fabricio Downing MD   Date:    04/11/2024   Time:    07:02      X-Ray Chest AP Portable   Final Result      Increase interstitial markings.      Otherwise no  active pulmonary disease         Electronically signed by: Milad Montana   Date:    04/11/2024   Time:    08:11      CT Head Without Contrast   Final Result      No acute abnormality seen         Electronically signed by: Terrell Hubbard   Date:    04/10/2024   Time:    19:00         I have reviewed all pertinent imaging results/findings within the past 24 hours.    Micro/Path/Other:  Microbiology Results (last 7 days)       Procedure Component Value Units Date/Time    Blood culture x two cultures. Draw prior to antibiotics. [[id number]]  (Normal) Collected: 04/11/24 0100    Order Status: Completed Specimen: Blood Updated: 04/12/24 0300     CULTURE, BLOOD ([facility]) No Growth At 24 Hours    Blood culture x two cultures. Draw prior to antibiotics. [[id number]]  (Normal) Collected: 04/11/24 0100    Order Status: Completed Specimen: Blood Updated: 04/12/24 0300     CULTURE, BLOOD ([facility]) No Growth At 24 Hours           Pathology Results  (Last 7 days)      None             Assessment & Plan:   53 y/o M with PMHx HTN, HLD, CHF, CAD s/p CABG, cardiac arrest x2 with stent placement on Plavix (last dose yesterday), and DM2 presenting with weakness, nausea, back and abdominal pain for one day. Imaging concerning for early cholecystitis.      -HIDA showing no filling of the gallbladder, concerning for cholecystitis  -Patient clinically stable without abdominal pain and normal WBC  -Recommend PO challenge. If passes, ok for discharge home from surgical standpoint  -Can set up clinic appointment for elective cholecystectomy    Sharonda Arce MD  [employer] General Surgery, PGY-2

## 2024-04-13 VITALS
RESPIRATION RATE: 18 BRPM | HEART RATE: 61 BPM | SYSTOLIC BLOOD PRESSURE: 162 MMHG | OXYGEN SATURATION: 97 % | TEMPERATURE: 98 F | HEIGHT: 70 IN | WEIGHT: 240 LBS | DIASTOLIC BLOOD PRESSURE: 99 MMHG | BODY MASS INDEX: 34.36 KG/M2

## 2024-04-13 PROBLEM — K81.0 ACUTE CHOLECYSTITIS: Status: ACTIVE | Noted: 2024-04-13

## 2024-04-13 LAB
ALBUMIN SERPL-MCNC: 2.9 G/DL (ref 3.5–5)
ALBUMIN/GLOB SERPL: 0.9 RATIO (ref 1.1–2)
ALP SERPL-CCNC: 128 UNIT/L (ref 40–150)
ALT SERPL-CCNC: 85 UNIT/L (ref 0–55)
AST SERPL-CCNC: 49 UNIT/L (ref 5–34)
BASOPHILS # BLD AUTO: 0.04 X10(3)/MCL
BASOPHILS NFR BLD AUTO: 0.5 %
BILIRUB SERPL-MCNC: 0.8 MG/DL
BUN SERPL-MCNC: 6.6 MG/DL (ref 8.4–25.7)
CALCIUM SERPL-MCNC: 8.7 MG/DL (ref 8.4–10.2)
CHLORIDE SERPL-SCNC: 107 MMOL/L (ref 98–107)
CO2 SERPL-SCNC: 26 MMOL/L (ref 22–29)
CREAT SERPL-MCNC: 0.76 MG/DL (ref 0.73–1.18)
EOSINOPHIL # BLD AUTO: 0.31 X10(3)/MCL (ref 0–0.9)
EOSINOPHIL NFR BLD AUTO: 4.1 %
ERYTHROCYTE [DISTWIDTH] IN BLOOD BY AUTOMATED COUNT: 13.6 % (ref 11.5–17)
GFR SERPLBLD CREATININE-BSD FMLA CKD-EPI: >60 MLS/MIN/1.73/M2
GLOBULIN SER-MCNC: 3.4 GM/DL (ref 2.4–3.5)
GLUCOSE SERPL-MCNC: 191 MG/DL (ref 74–100)
HCT VFR BLD AUTO: 45.2 % (ref 42–52)
HGB BLD-MCNC: 15.6 G/DL (ref 14–18)
IMM GRANULOCYTES # BLD AUTO: 0.03 X10(3)/MCL (ref 0–0.04)
IMM GRANULOCYTES NFR BLD AUTO: 0.4 %
LYMPHOCYTES # BLD AUTO: 1.91 X10(3)/MCL (ref 0.6–4.6)
LYMPHOCYTES NFR BLD AUTO: 25.6 %
MCH RBC QN AUTO: 28.7 PG (ref 27–31)
MCHC RBC AUTO-ENTMCNC: 34.5 G/DL (ref 33–36)
MCV RBC AUTO: 83.1 FL (ref 80–94)
MONOCYTES # BLD AUTO: 0.68 X10(3)/MCL (ref 0.1–1.3)
MONOCYTES NFR BLD AUTO: 9.1 %
NEUTROPHILS # BLD AUTO: 4.5 X10(3)/MCL (ref 2.1–9.2)
NEUTROPHILS NFR BLD AUTO: 60.3 %
NRBC BLD AUTO-RTO: 0 %
PLATELET # BLD AUTO: 172 X10(3)/MCL (ref 130–400)
PMV BLD AUTO: 11 FL (ref 7.4–10.4)
POCT GLUCOSE: 244 MG/DL (ref 70–110)
POCT GLUCOSE: 256 MG/DL (ref 70–110)
POTASSIUM SERPL-SCNC: 3.5 MMOL/L (ref 3.5–5.1)
PROT SERPL-MCNC: 6.3 GM/DL (ref 6.4–8.3)
RBC # BLD AUTO: 5.44 X10(6)/MCL (ref 4.7–6.1)
SODIUM SERPL-SCNC: 141 MMOL/L (ref 136–145)
WBC # SPEC AUTO: 7.47 X10(3)/MCL (ref 4.5–11.5)

## 2024-04-13 PROCEDURE — 25000003 PHARM REV CODE 250: Performed by: INTERNAL MEDICINE

## 2024-04-13 PROCEDURE — 63600175 PHARM REV CODE 636 W HCPCS: Performed by: INTERNAL MEDICINE

## 2024-04-13 PROCEDURE — 63600175 PHARM REV CODE 636 W HCPCS: Performed by: PHYSICIAN ASSISTANT

## 2024-04-13 PROCEDURE — 80053 COMPREHEN METABOLIC PANEL: CPT | Performed by: INTERNAL MEDICINE

## 2024-04-13 PROCEDURE — 85025 COMPLETE CBC W/AUTO DIFF WBC: CPT | Performed by: INTERNAL MEDICINE

## 2024-04-13 RX ORDER — CEFDINIR 300 MG/1
300 CAPSULE ORAL 2 TIMES DAILY
Qty: 10 CAPSULE | Refills: 0 | Status: SHIPPED | OUTPATIENT
Start: 2024-04-13 | End: 2024-04-18

## 2024-04-13 RX ORDER — AMLODIPINE BESYLATE 5 MG/1
5 TABLET ORAL DAILY
Qty: 90 TABLET | Refills: 3 | Status: SHIPPED | OUTPATIENT
Start: 2024-04-14 | End: 2025-04-14

## 2024-04-13 RX ADMIN — SACUBITRIL AND VALSARTAN 1 TABLET: 97; 103 TABLET, FILM COATED ORAL at 07:04

## 2024-04-13 RX ADMIN — CARVEDILOL 25 MG: 12.5 TABLET, FILM COATED ORAL at 07:04

## 2024-04-13 RX ADMIN — ASPIRIN 81 MG: 81 TABLET, COATED ORAL at 07:04

## 2024-04-13 RX ADMIN — INSULIN ASPART 2 UNITS: 100 INJECTION, SOLUTION INTRAVENOUS; SUBCUTANEOUS at 07:04

## 2024-04-13 RX ADMIN — HYDROCODONE BITARTRATE AND ACETAMINOPHEN 1 TABLET: 5; 325 TABLET ORAL at 07:04

## 2024-04-13 RX ADMIN — ATORVASTATIN CALCIUM 40 MG: 40 TABLET, FILM COATED ORAL at 09:04

## 2024-04-13 RX ADMIN — AMLODIPINE BESYLATE 5 MG: 5 TABLET ORAL at 07:04

## 2024-04-13 RX ADMIN — AMLODIPINE BESYLATE 5 MG: 5 TABLET ORAL at 09:04

## 2024-04-13 RX ADMIN — SACUBITRIL AND VALSARTAN 1 TABLET: 97; 103 TABLET, FILM COATED ORAL at 09:04

## 2024-04-13 RX ADMIN — ATORVASTATIN CALCIUM 40 MG: 40 TABLET, FILM COATED ORAL at 07:04

## 2024-04-13 RX ADMIN — ASPIRIN 81 MG: 81 TABLET, COATED ORAL at 09:04

## 2024-04-13 RX ADMIN — PIPERACILLIN SODIUM AND TAZOBACTAM SODIUM 4.5 G: 4; .5 INJECTION, POWDER, LYOPHILIZED, FOR SOLUTION INTRAVENOUS at 05:04

## 2024-04-13 RX ADMIN — INSULIN ASPART 4 UNITS: 100 INJECTION, SOLUTION INTRAVENOUS; SUBCUTANEOUS at 11:04

## 2024-04-14 NOTE — PROGRESS NOTES
Acute Care Surgery   Progress Note  Admit Date: 4/10/2024  HD#2  POD#* No surgery found *    Subjective:   Interval history:  Pt afebrile with stable vital signs  No abdominal pain  Tolerating diet without n/v    Home Meds:  Current Outpatient Medications   Medication Instructions    [START ON 4/14/2024] amLODIPine (NORVASC) 5 mg, Oral, Daily    aspirin (ECOTRIN) 81 mg, Oral, Daily    carvediloL (COREG) 25 mg, Oral, 2 times daily with meals    cefdinir (OMNICEF) 300 mg, Oral, 2 times daily    furosemide (LASIX) 40 mg, Oral, Daily    rosuvastatin (CRESTOR) 40 mg, Oral, Nightly    sacubitriL-valsartan (ENTRESTO)  mg per tablet 1 tablet, Oral, 2 times daily      Scheduled Meds:  No current facility-administered medications for this encounter.     Current Outpatient Medications   Medication Sig Dispense Refill    [START ON 4/14/2024] amLODIPine (NORVASC) 5 MG tablet Take 1 tablet (5 mg total) by mouth once daily. 90 tablet 3    aspirin (ECOTRIN) 81 MG EC tablet Take 81 mg by mouth once daily.      carvediloL (COREG) 25 MG tablet Take 25 mg by mouth 2 (two) times daily with meals.      cefdinir (OMNICEF) 300 MG capsule Take 1 capsule (300 mg total) by mouth 2 (two) times daily. for 5 days 10 capsule 0    furosemide (LASIX) 40 MG tablet Take 40 mg by mouth once daily.      rosuvastatin (CRESTOR) 40 MG Tab Take 40 mg by mouth every evening.      sacubitriL-valsartan (ENTRESTO)  mg per tablet Take 1 tablet by mouth 2 (two) times daily.       Continuous Infusions:  No current facility-administered medications for this encounter.     Current Outpatient Medications   Medication Sig Dispense Refill    [START ON 4/14/2024] amLODIPine (NORVASC) 5 MG tablet Take 1 tablet (5 mg total) by mouth once daily. 90 tablet 3    aspirin (ECOTRIN) 81 MG EC tablet Take 81 mg by mouth once daily.      carvediloL (COREG) 25 MG tablet Take 25 mg by mouth 2 (two) times daily with meals.      cefdinir (OMNICEF) 300 MG capsule Take 1  "capsule (300 mg total) by mouth 2 (two) times daily. for 5 days 10 capsule 0    furosemide (LASIX) 40 MG tablet Take 40 mg by mouth once daily.      rosuvastatin (CRESTOR) 40 MG Tab Take 40 mg by mouth every evening.      sacubitriL-valsartan (ENTRESTO)  mg per tablet Take 1 tablet by mouth 2 (two) times daily.       PRN Meds:  No current facility-administered medications for this encounter.     Current Outpatient Medications   Medication Sig Dispense Refill    [START ON 4/14/2024] amLODIPine (NORVASC) 5 MG tablet Take 1 tablet (5 mg total) by mouth once daily. 90 tablet 3    aspirin (ECOTRIN) 81 MG EC tablet Take 81 mg by mouth once daily.      carvediloL (COREG) 25 MG tablet Take 25 mg by mouth 2 (two) times daily with meals.      cefdinir (OMNICEF) 300 MG capsule Take 1 capsule (300 mg total) by mouth 2 (two) times daily. for 5 days 10 capsule 0    furosemide (LASIX) 40 MG tablet Take 40 mg by mouth once daily.      rosuvastatin (CRESTOR) 40 MG Tab Take 40 mg by mouth every evening.      sacubitriL-valsartan (ENTRESTO)  mg per tablet Take 1 tablet by mouth 2 (two) times daily.          Objective:     VITAL SIGNS: 24 HR MIN & MAX LAST   Temp  Min: 97.7 °F (36.5 °C)  Max: 98.1 °F (36.7 °C)  97.7 °F (36.5 °C)   BP  Min: 124/85  Max: 162/99  (!) 162/99    Pulse  Min: 61  Max: 64  61    Resp  Min: 18  Max: 18  18    SpO2  Min: 95 %  Max: 97 %  97 %      HT: 5' 10" (177.8 cm)  WT: 108.9 kg (240 lb)  BMI: 34.4     Intake/output:  Intake/Output - Last 3 Shifts         04/11 0700 04/12 0659 04/12 0700 04/13 0659 04/13 0700 04/14 0659    P.O.  180     I.V. (mL/kg)       Total Intake(mL/kg)  180 (1.7)     Urine (mL/kg/hr) 500 (0.2)      Total Output 500      Net -500 +180            Urine Occurrence  6 x     Stool Occurrence  4 x 0 x          No intake or output data in the 24 hours ending 04/13/24 9755          Lines/drains/airway:       Peripheral IV - Single Lumen 04/11/24 0058 20 G Right Antecubital " (Active)   Site Assessment Clean;Dry;Intact 04/12/24 0800   Extremity Assessment Distal to IV No abnormal discoloration;No redness;No swelling;No warmth 04/11/24 2000   Line Status Saline locked 04/11/24 2000   Dressing Status Clean;Dry;Intact 04/12/24 0800   Dressing Intervention Integrity maintained 04/11/24 1800   Number of days: 1            Peripheral IV - Single Lumen 04/11/24 0050 18 G Left Antecubital (Active)   Site Assessment Clean;Dry;Intact 04/12/24 0800   Extremity Assessment Distal to IV No abnormal discoloration;No redness;No swelling;No warmth 04/11/24 2000   Line Status Saline locked 04/11/24 2000   Dressing Status Clean;Dry;Intact 04/12/24 0800   Dressing Intervention Integrity maintained 04/11/24 1800   Number of days: 1       Physical examination:  Gen: NAD, AAOx3, answering questions appropriately  HEENT: NCAT  CV: RRR  Resp: NWOB on room air  Abd: Soft, nondistended, nontender  Ext: moving all extremities spontaneously and purposefully  Neuro: CN II-XII grossly intact    Labs:  Renal:  Recent Labs     04/11/24  0705 04/12/24 0410 04/13/24  0434   BUN 9.1 7.5* 6.6*   CREATININE 0.82 0.73 0.76     Recent Labs     04/11/24  0100 04/11/24  0329   LACTIC 2.1 1.4     FENGI:  Recent Labs     04/11/24  0705 04/12/24  0410 04/13/24  0434   * 135* 141   K 3.4* 3.2* 3.5   CO2 27 24 26   CALCIUM 8.3* 8.4 8.7   MG 1.40* 2.10  --    PHOS 3.2 2.3  --    ALBUMIN 3.0* 2.7* 2.9*   BILITOT 2.3*  2.3* 1.0 0.8   AST 52* 40* 49*   ALKPHOS 89 95 128   ALT 81* 68* 85*     Heme:  Recent Labs     04/11/24  0705 04/12/24  0410 04/13/24  0434   HGB 14.3 14.2 15.6   HCT 40.6* 41.7* 45.2    141 172   INR  --  1.1  --      ID:  Recent Labs     04/11/24  0705 04/12/24  0410 04/13/24  0434   WBC 18.98* 9.23 7.47     CBG:  Recent Labs     04/11/24  0705 04/12/24  0410 04/13/24  0434   GLUCOSE 122* 243* 191*      Cardiovascular:  Recent Labs   Lab 04/10/24  1825 04/11/24  0705   TROPONINI 0.016 0.010   BNP  --   180.6*     I have reviewed all pertinent lab results within the past 24 hours.    Imaging:  NM Hepatobiliary Scan (HIDA)   Final Result      Gallbladder lack of radioisotope activity during 4 hours of scintigraphy reflects acute cholecystitis.  Please correlate clinically.         Electronically signed by: Ishmael Rossi   Date:    04/12/2024   Time:    13:03      US Abdomen Limited   Final Result      1. The gallbladder is mildly distended and demonstrates sludge with possible tiny stones in the region of the neck. Similar findings are also noted on the prior examination. The gallbladder wall thickness seems over estimated on the submitted images, measuring 4.4 mm. Sonographic Flores sign is negative on real time evaluation. Indeterminate for cholecystitis. Follow as clinically indicated.      This report is concordant with the preliminary [facility] report.         Electronically signed by: Fabricio Downing MD   Date:    04/11/2024   Time:    07:01      CT Abdomen Pelvis With IV Contrast NO Oral Contrast   Final Result      1. There is mild gallbladder distension. Moderate dependent hyperdensity is seen in the gallbladder which may represent sludge and/or tiny small varisized stones. There is mild wall thickening and minimal pericholecystic fluid. These may represent beginning or mild cholecystitis. Correlate with clinical and laboratory findings as regards additional evaluation and follow-up.      This report is concordant with the preliminary [facility] report.         Electronically signed by: Fabricio Downing MD   Date:    04/11/2024   Time:    07:02      X-Ray Chest AP Portable   Final Result      Increase interstitial markings.      Otherwise no active pulmonary disease         Electronically signed by: Milad Montana   Date:    04/11/2024   Time:    08:11      CT Head Without Contrast   Final Result      No acute abnormality seen         Electronically signed by: Terrell Hubbard   Date:    04/10/2024    Time:    19:00         I have reviewed all pertinent imaging results/findings within the past 24 hours.    Micro/Path/Other:  Microbiology Results (last 7 days)       Procedure Component Value Units Date/Time    Blood culture x two cultures. Draw prior to antibiotics. [[id number]]  (Normal) Collected: 04/11/24 0100    Order Status: Completed Specimen: Blood Updated: 04/13/24 0300     CULTURE, BLOOD ([facility]) No Growth At 48 Hours    Blood culture x two cultures. Draw prior to antibiotics. [[id number]]  (Normal) Collected: 04/11/24 0100    Order Status: Completed Specimen: Blood Updated: 04/13/24 0300     CULTURE, BLOOD ([facility]) No Growth At 48 Hours           Pathology Results  (Last 7 days)      None             Assessment & Plan:   55 y/o M with PMHx HTN, HLD, CHF, CAD s/p CABG, cardiac arrest x2 with stent placement on Plavix (last dose yesterday), and DM2 presenting with weakness, nausea, back and abdominal pain for one day. Imaging concerning for early cholecystitis.      -HIDA showing no filling of the gallbladder, concerning for cholecystitis  -Patient clinically stable without abdominal pain and normal WBC  -Patient asymptomatic from this and tolerating PO. OK for discharge from general surgery standpoint  -Can set up clinic appointment for elective cholecystectomy    Marissa Hitchcock MD  [employer] General Surgery, PGY-4

## 2024-04-15 LAB — PATH REV: NORMAL

## 2024-04-16 LAB
BACTERIA BLD CULT: NORMAL
BACTERIA BLD CULT: NORMAL

## 2024-04-16 NOTE — DISCHARGE SUMMARY
[facility]  Hospital Medicine Discharge Summary    Admit Date: 4/10/2024  Discharge Date and Time: 4/16/20242:56 PM  Admitting Physician: LISA Team  Discharging Physician: Florence Chapa DO.  Primary Care Physician: Unable, To Obtain  Consults: General Surgery    Discharge Diagnoses:  Acute cholecystitis  Gallbladder sludge with transaminitis and hyperbilirubinemia   Leukocytosis probably due to above , resolved  Sepsis, resolved   Fever, resolved   Mild hypokalemia, resolved   Severe hypomagnesemia, resolved   Tobacco use  History of  hypertension, hyperlipidemia, diabetes mellitus type 2  ICMO with EF 30-35% with A1CD, coronary disease status post CABG x4 in 2021, hx of cardiac arrest,  Syncope     Hospital Course:   54-year-old male with known past medical history of hypertension, hyperlipidemia, congestive heart failure with EF 30-35% with AICD, history of coronary artery disease status post CABG x4 and PCI, cardiac arrest, diabetes mellitus type 2 admitted 04/11/2024 after having 4-5 syncopal episodes yesterday.  Reports he did not fall or hit his head but unsure how long he was out.  Last remember was when he was going to the restroom while using it, he passed out and woke up sitting on the toilet seat. Patient reports he did felt weak, nauseous, lightheaded prior to passing out.  Patient also complained of abdominal been and also back pain.  Patient denies chest pain, shortness on breath, fever chills.  He does not know the name of his PCP, his cardiologist, any of his medication other than glipizide which he said he stopped taking it given it was making him sick.  Reported his PCP is aware  Patient is not sure if he takes Plavix or not  Patient is asking for something to eat or drink.  Informed that surgery will come see him and that surgery has consulted Cardiology for cardiac clearance as well in case he needs surgery.  Patient reported he has not eaten in 2 days  I reviewed  patient's chart, imaging, vital signs, ED note  Did noted he has spiked a fever of 101°, patient has been empirically started on Zosyn  General surgery was consulted since HIDA scan was positive.  General surgery evaluated the patient recommended no surgical intervention at this time.  Plan for cholecystectomy outpatient.  Fever and leukocytosis that resolved.  Liver enzymes have improved.  Carotid ultrasound and echo was done which was largely unremarkable.  Carotid ultrasound-Preliminary report revealed less than 50% stenosis bilateral ICA, bilateral vertebral arteries with patent antegrade flow  Echo- technically difficult study, low normal EF of 50-55%, grade 1 diastolic dysfunction, much improved when compared to EF noted on [facility] on 04/30/2019 which was 30-35%  Patient was tolerating diet and doing just fine.  Ready to go home.  Denies any further pain at this time.  No concern at this time.  Labs and vitals stable on day of discharge.  Patient will follow up with General surgery and family doctor outpatient.    Pt was seen and examined on the day of discharge  Vitals:  VITAL SIGNS: 24 HRS MIN & MAX LAST   No data recorded 97.7 °F (36.5 °C)   No data recorded (!) 162/99   No data recorded  61   No data recorded 18   No data recorded 97 %       Physical Exam:  Vitals reviewed.  General: In no acute distress, afebrile currently, obese,   Chest: Clear to auscultation bilaterally anteriorly  Heart: S1, S2, no appreciable murmur  Abdomen obese, protuberant, BS +  MSK: Warm, no lower extremity edema, no clubbing or cyanosis  Neurologic: Alert and oriented x4, moving all extremities with good strength     Procedures Performed: No admission procedures for hospital encounter.     Significant Diagnostic Studies: See Full reports for all details    Recent Labs   Lab 04/11/24  0705 04/12/24  0410 04/13/24  0434   WBC 18.98* 9.23 7.47   RBC 4.93 4.99 5.44   HGB 14.3 14.2 15.6   HCT 40.6* 41.7* 45.2   MCV 82.4 83.6 83.1   MCH  29.0 28.5 28.7   MCHC 35.2 34.1 34.5   RDW 13.7 13.5 13.6    141 172   MPV 10.0 10.4 11.0*       Recent Labs   Lab 04/11/24  0705 04/12/24  0410 04/13/24  0434   * 135* 141   K 3.4* 3.2* 3.5   CO2 27 24 26   BUN 9.1 7.5* 6.6*   CREATININE 0.82 0.73 0.76   CALCIUM 8.3* 8.4 8.7   MG 1.40* 2.10  --    ALBUMIN 3.0* 2.7* 2.9*   ALKPHOS 89 95 128   ALT 81* 68* 85*   AST 52* 40* 49*   BILITOT 2.3*  2.3* 1.0 0.8        Microbiology Results (last 7 days)       ** No results found for the last 168 hours. **             [facility] Hepatobiliary Scan (HIDA)  Narrative: EXAMINATION:  [facility]  (HIDA)    CLINICAL HISTORY:  Cholelithiasis;elevated LFTs, evaluate for cholecystitis;    TECHNIQUE:  8.8 mCi of intravenous Choletec was administered followed by focused gamma camera imaging of the abdomen.    COMPARISON:  Ultrasound abdomen and CT abdomen April 11, 2024.    FINDINGS:  There is prompt hepatocellular uptake.  Small bowel activity is identified by 15 minutes progressing through 60 minutes of imaging.    There was no accumulation of radioisotope within the gallbladder lumen during initial 60 minutes of scintigraphy.  There was again no gallbladder uptake on the delayed scintigraphy performed at 2 hours and 4 hours.  Impression: Gallbladder lack of radioisotope activity during 4 hours of scintigraphy reflects acute cholecystitis.  Please correlate clinically.    Electronically signed by: Ishmael Rossi  Date:    04/12/2024  Time:    13:03  CV Ultrasound Bilateral Doppler Carotid  The right internal carotid artery demonstrated less than 50% stenosis.  The left internal carotid artery demonstrated less than 50% stenosis.  Bilateral vertebral arteries were patent with antegrade flow.         Medication List        START taking these medications      cefdinir 300 MG capsule  Commonly known as: OMNICEF  Take 1 capsule (300 mg total) by mouth 2 (two) times daily. for 5 days            CHANGE how you take  these medications      amLODIPine 5 MG tablet  Commonly known as: NORVASC  Take 1 tablet (5 mg total) by mouth once daily.  What changed:   medication strength  how much to take            CONTINUE taking these medications      aspirin 81 MG EC tablet  Commonly known as: ECOTRIN     carvediloL 25 MG tablet  Commonly known as: COREG     ENTRESTO  mg per tablet  Generic drug: sacubitriL-valsartan     furosemide 40 MG tablet  Commonly known as: LASIX     rosuvastatin 40 MG Tab  Commonly known as: CRESTOR               Where to Get Your Medications        These medications were sent to [id number] [address] AT [facility] & [address] [address]      Hours: 24-hours Phone: [phone]   amLODIPine 5 MG tablet  cefdinir 300 MG capsule          Explained in detail to the patient about the discharge plan, medications, and follow-up visits. Pt understands and agrees with the treatment plan  Discharge Disposition: Home or Self Care   Discharged Condition: stable  Diet-    Medications Per [place] med rec  Activities as tolerated   Follow-up Information       Curry Weeks MD Follow up in 1 month(s).    Specialty: General Surgery  Why: Elective choley  Contact information:  [address]  [address]  [facility] [address] [phone]               [facility] [place] Follow up.    Why: Pt not aware of 's name that he sees, but this is the clinic.   We will call you with appt.  Contact information:  [phone]                         For further questions contact hospitalist office    Discharge time 33 minutes    For worsening symptoms, chest pain, shortness of breath, increased abdominal pain, high grade fever, stroke or stroke like symptoms, immediately go to the nearest Emergency Room or call 911 as soon as possible.    Florence Chapa DO  Department of Hospital Medicine  [facility]  04/16/2024

## 2024-04-25 DIAGNOSIS — K81.9 CHOLECYSTITIS: Primary | ICD-10-CM

## 2024-05-07 ENCOUNTER — OFFICE VISIT (OUTPATIENT)
Dept: SURGERY | Facility: CLINIC | Age: 54
End: 2024-05-07
Payer: MEDICAID

## 2024-05-07 VITALS
HEIGHT: 70 IN | HEART RATE: 63 BPM | WEIGHT: 234 LBS | OXYGEN SATURATION: 97 % | DIASTOLIC BLOOD PRESSURE: 82 MMHG | SYSTOLIC BLOOD PRESSURE: 158 MMHG | TEMPERATURE: 99 F | RESPIRATION RATE: 20 BRPM | BODY MASS INDEX: 33.5 KG/M2

## 2024-05-07 DIAGNOSIS — K81.9 CHOLECYSTITIS: ICD-10-CM

## 2024-05-07 PROCEDURE — 99214 OFFICE O/P EST MOD 30 MIN: CPT | Mod: PBBFAC

## 2024-05-07 NOTE — PROGRESS NOTES
[facility]   Clinic Note       CC: Cholecystitis       HPI: Manas Felix Jr. is a 54 y.o. male with PMHx of HTN, HLD, CHF, CAD s/p CBAG, cardiac arrest x 2 with stent placement on plavix, T2DM, and stroke in 2021 who present to clinic for evaluation of cholecystitis. Patient states that he was recently admitted to [facility] for nausea, vomiting, and abdominal pain, which time he was diagnosed with acute cholecystitis. During his hospital stay, his systems improved and he was discharged with plan for outpatient surgery. Patient states that since he was discharged from the hospital he has been doing well. Tolerating a diet without any nausea, vomiting, or abdominal pain. He denies any fevers, chest, shortness of breath, or issues with bowel movements. He smokes 1PPD. He takes norco daily.        Physical Exam:   Vitals:    05/07/24 1021   BP: (!) 160/75   Pulse: 63   Resp: 20   Temp: 98.5 °F (36.9 °C)      Gen:  NAD, AAOx3   Eye:  ANDRA, EOMI   CVS:  Normal RRR   Chest:  Non-labored breathing on room air   Abd:    s/nt/nd   Ext:  Move all 4 extremities.     IMAGING:  US ABDOMEN LIMITED 4/11     CLINICAL HISTORY:  possible cholecystitis on CT;     COMPARISON:  No prior pertinent studies currently available for comparison.     FINDINGS:  Findings: Evaluation is limited due to body habitus.Liver: There is mildly enlarged and measures 18 cm with diffuse fatty infiltration. The liver otherwise appears unremarkable.Biliary ducts: The common bile duct is within normal limits at 3.6 mm in diameter.Gallbladder: The gallbladder is mildly distended and demonstrates sludge with possible tiny stones in the region of the neck. Similar findings are also noted on the prior examination. The gallbladder wall thickness seems over estimated on the submitted images, measuring 4.4 mm. Sonographic Flores sign is negative on real time evaluation. Indeterminate for cholecystitis. Follow as clinically indicated.Pancreas: The  pancreas cannot be definitively evaluated due to obscuration by bowel gas.Right kidney:Dimensions: The right kidney measures 12.9 x 6.3 x 8.2 cm and appears unremarkable.Vascular:Portal vein: Flow parameters are within normal limits with hepatopetal flow.Aorta: The abdominal aorta is obscured due to bowel gas.IVC: The IVC is within normal limits.     Impression:     1. The gallbladder is mildly distended and demonstrates sludge with possible tiny stones in the region of the neck. Similar findings are also noted on the prior examination. The gallbladder wall thickness seems over estimated on the submitted images, measuring 4.4 mm. Sonographic Flores sign is negative on real time evaluation. Indeterminate for cholecystitis. Follow as clinically indicated.     This report is concordant with the preliminary [facility] report.      [facility]  (HIDA) 4/11     CLINICAL HISTORY:  Cholelithiasis;elevated LFTs, evaluate for cholecystitis;     TECHNIQUE:  8.8 mCi of intravenous Choletec was administered followed by focused gamma camera imaging of the abdomen.     COMPARISON:  Ultrasound abdomen and CT abdomen April 11, 2024.     FINDINGS:  There is prompt hepatocellular uptake.  Small bowel activity is identified by 15 minutes progressing through 60 minutes of imaging.     There was no accumulation of radioisotope within the gallbladder lumen during initial 60 minutes of scintigraphy.  There was again no gallbladder uptake on the delayed scintigraphy performed at 2 hours and 4 hours.     Impression:     Gallbladder lack of radioisotope activity during 4 hours of scintigraphy reflects acute cholecystitis.  Please correlate clinically.    A/P: Manas Felix . is a 54 y.o. male with PMHx of HTN, HLD, CHF, CAD s/p CBAG, cardiac arrest x 2 with stent placement on plavix, T2DM, and stroke in 2021 who present to clinic for evaluation of cholecystitis.       - All imaging, labs, and vital signs review.    - Per cardiology note from patient recent hospitalization, patient has a 15% risk of major cardiac event. As patient is currently asymptomatic, will defer any surgical intervention unless he becomes symptomatic. Plan discusses with patient, and he agrees.   - ED return precaution given.   - Follow up MAGUE Regalado MD  [facility] General Surgery PGY-1   05/07/2024 10:48 AM